# Patient Record
Sex: FEMALE | Race: WHITE | NOT HISPANIC OR LATINO | Employment: FULL TIME | URBAN - METROPOLITAN AREA
[De-identification: names, ages, dates, MRNs, and addresses within clinical notes are randomized per-mention and may not be internally consistent; named-entity substitution may affect disease eponyms.]

---

## 2023-04-03 LAB
ALANINE AMINOTRANSFERASE (SGPT) (U/L) IN SER/PLAS: 14 U/L (ref 7–45)
ALBUMIN (G/DL) IN SER/PLAS: 4.5 G/DL (ref 3.4–5)
ALKALINE PHOSPHATASE (U/L) IN SER/PLAS: 48 U/L (ref 33–110)
ANION GAP IN SER/PLAS: 10 MMOL/L (ref 10–20)
ASPARTATE AMINOTRANSFERASE (SGOT) (U/L) IN SER/PLAS: 14 U/L (ref 9–39)
BILIRUBIN TOTAL (MG/DL) IN SER/PLAS: 0.8 MG/DL (ref 0–1.2)
C REACTIVE PROTEIN (MG/L) IN SER/PLAS: 0.11 MG/DL
CALCIUM (MG/DL) IN SER/PLAS: 9.2 MG/DL (ref 8.6–10.3)
CARBON DIOXIDE, TOTAL (MMOL/L) IN SER/PLAS: 28 MMOL/L (ref 21–32)
CHLORIDE (MMOL/L) IN SER/PLAS: 105 MMOL/L (ref 98–107)
CREATININE (MG/DL) IN SER/PLAS: 0.91 MG/DL (ref 0.5–1.05)
ERYTHROCYTE DISTRIBUTION WIDTH (RATIO) BY AUTOMATED COUNT: 12.7 % (ref 11.5–14.5)
ERYTHROCYTE MEAN CORPUSCULAR HEMOGLOBIN CONCENTRATION (G/DL) BY AUTOMATED: 32.9 G/DL (ref 32–36)
ERYTHROCYTE MEAN CORPUSCULAR VOLUME (FL) BY AUTOMATED COUNT: 98 FL (ref 80–100)
ERYTHROCYTES (10*6/UL) IN BLOOD BY AUTOMATED COUNT: 4.38 X10E12/L (ref 4–5.2)
GFR FEMALE: 89 ML/MIN/1.73M2
GLUCOSE (MG/DL) IN SER/PLAS: 83 MG/DL (ref 74–99)
HEMATOCRIT (%) IN BLOOD BY AUTOMATED COUNT: 43.1 % (ref 36–46)
HEMOGLOBIN (G/DL) IN BLOOD: 14.2 G/DL (ref 12–16)
IGA (MG/DL) IN SER/PLAS: 123 MG/DL (ref 70–400)
IRON (UG/DL) IN SER/PLAS: 156 UG/DL (ref 35–150)
IRON BINDING CAPACITY (UG/DL) IN SER/PLAS: 329 UG/DL (ref 240–445)
IRON SATURATION (%) IN SER/PLAS: 47 % (ref 25–45)
LEUKOCYTES (10*3/UL) IN BLOOD BY AUTOMATED COUNT: 3.7 X10E9/L (ref 4.4–11.3)
PLATELETS (10*3/UL) IN BLOOD AUTOMATED COUNT: 189 X10E9/L (ref 150–450)
POTASSIUM (MMOL/L) IN SER/PLAS: 4 MMOL/L (ref 3.5–5.3)
PROTEIN TOTAL: 6.8 G/DL (ref 6.4–8.2)
SEDIMENTATION RATE, ERYTHROCYTE: <1 MM/H (ref 0–20)
SODIUM (MMOL/L) IN SER/PLAS: 139 MMOL/L (ref 136–145)
TISSUE TRANSGLUTAMINASE IGG: <1 U/ML (ref 0–14)
UREA NITROGEN (MG/DL) IN SER/PLAS: 12 MG/DL (ref 6–23)

## 2023-04-16 LAB
CAMPYLOBACTER GP: NOT DETECTED
NOROVIRUS GI/GII: NOT DETECTED
ROTAVIRUS A: NOT DETECTED
SALMONELLA SP.: NOT DETECTED
SHIGA TOXIN 1: NOT DETECTED
SHIGA TOXIN 2: NOT DETECTED
SHIGELLA SP.: NOT DETECTED
VIBRIO GRP.: NOT DETECTED
YERSINIA ENTEROCOLITICA: NOT DETECTED

## 2023-04-19 LAB — CALPROTECTIN, STOOL: 64 UG/G

## 2023-04-20 LAB — REDUCING SUBSTANCES PRESENCE IN STOOL: ABNORMAL

## 2023-06-09 ENCOUNTER — LAB (OUTPATIENT)
Dept: LAB | Facility: LAB | Age: 28
End: 2023-06-09
Payer: COMMERCIAL

## 2023-06-09 LAB — TOBACCO SCREEN, URINE: NEGATIVE

## 2023-10-28 PROBLEM — R53.83 FATIGUE: Status: ACTIVE | Noted: 2023-10-28

## 2023-10-28 PROBLEM — R20.9 COLD HANDS: Status: ACTIVE | Noted: 2023-10-28

## 2023-10-28 PROBLEM — B36.0 PITYRIASIS VERSICOLOR: Status: ACTIVE | Noted: 2022-12-05

## 2023-10-28 PROBLEM — S86.899A MEDIAL TIBIAL STRESS SYNDROME: Status: ACTIVE | Noted: 2023-10-28

## 2023-10-28 PROBLEM — L50.3 DERMATOGRAPHISM: Status: ACTIVE | Noted: 2022-12-05

## 2023-10-28 PROBLEM — H49.11: Status: ACTIVE | Noted: 2020-03-10

## 2023-10-28 PROBLEM — B35.1 ONYCHOMYCOSIS: Status: ACTIVE | Noted: 2023-10-28

## 2023-10-28 PROBLEM — B35.1 ONYCHOMYCOSIS OF GREAT TOE: Status: ACTIVE | Noted: 2023-10-28

## 2023-10-28 PROBLEM — R51.9 HEADACHE: Status: ACTIVE | Noted: 2023-10-28

## 2023-10-28 PROBLEM — L30.9 ECZEMA: Status: ACTIVE | Noted: 2023-10-28

## 2023-10-28 PROBLEM — H53.8 BLURRY VISION: Status: ACTIVE | Noted: 2023-10-28

## 2023-10-28 PROBLEM — H50.21 HYPERTROPIA OF RIGHT EYE: Status: ACTIVE | Noted: 2020-03-10

## 2023-10-28 PROBLEM — S06.0X1A CONCUSSION WTH LOSS OF CONSCIOUSNESS OF 30 MINUTES OR LESS: Status: ACTIVE | Noted: 2023-10-28

## 2023-10-28 PROBLEM — H53.2 DIPLOPIA: Status: ACTIVE | Noted: 2020-03-10

## 2023-10-28 PROBLEM — S09.90XA HEAD INJURY: Status: ACTIVE | Noted: 2023-10-28

## 2023-10-28 PROBLEM — M89.9 LUMP OF RIB: Status: ACTIVE | Noted: 2023-10-28

## 2023-10-28 PROBLEM — R55 MICTURITION SYNCOPE: Status: ACTIVE | Noted: 2023-10-28

## 2023-10-28 RX ORDER — LORATADINE 10 MG/1
10 TABLET ORAL
COMMUNITY
Start: 2022-12-05 | End: 2023-12-04 | Stop reason: WASHOUT

## 2023-10-28 RX ORDER — KETOCONAZOLE 20 MG/G
1 CREAM TOPICAL
COMMUNITY
Start: 2022-12-05 | End: 2023-12-04 | Stop reason: WASHOUT

## 2023-10-28 RX ORDER — ONDANSETRON 4 MG/1
4 TABLET, ORALLY DISINTEGRATING ORAL EVERY 4 HOURS PRN
COMMUNITY
Start: 2022-09-25 | End: 2023-12-04 | Stop reason: WASHOUT

## 2023-10-30 ENCOUNTER — TELEPHONE (OUTPATIENT)
Dept: GASTROENTEROLOGY | Facility: CLINIC | Age: 28
End: 2023-10-30

## 2023-10-30 ENCOUNTER — OFFICE VISIT (OUTPATIENT)
Dept: GASTROENTEROLOGY | Facility: CLINIC | Age: 28
End: 2023-10-30
Payer: COMMERCIAL

## 2023-10-30 DIAGNOSIS — R14.0 BLOATING: Primary | ICD-10-CM

## 2023-10-30 PROCEDURE — 91065 BREATH HYDROGEN/METHANE TEST: CPT | Performed by: NURSE PRACTITIONER

## 2023-10-30 NOTE — PROGRESS NOTES
Patient ID: Nadia Souza is a 28 y.o. female.    Breath Hydrogen Test-Bacterial Overgrowth    Date/Time: 10/30/2023 10:39 AM    Performed by: Eliza Pfeiffer RN  Authorized by: LOIDA Hansen    Universal protocol:     Patient identity confirmed:  Verbally with patient  Indications:     Indications:  Bloating  Sedation:     Sedation type:  None  Post-procedure details:     Procedure completion:  Tolerated  Hydrogen Breath Analysis Consultation Sheet    Referring Provider: Loida Jimenez  [facility] [address] [address]      Symptoms: Bloating    Age: 28 y.o.  Weight: There were no vitals filed for this visit.  Substrate: 75 GMS DEXTROSE     Last Meal: 1830  Recent Antibiotics: Denies    RESULTS:   Time PPM (H2) APPM* (CH4) CO2 Correction   Baseline #1 825 2 3 6.6 0.84   Baseline #2 830 2 3 6.4 0.86   *Challenge Dose Sugar: 840  15' 0900 4 5 6.2 0.82   30' 0915 16 8 6.6 0.68   45' 0930 15 6 8.0 0.67   60' 0945 5 5 8.3 0.79   75' 1000 5 5 7.0 0.66   90' 1015 5 5 7.6 0.72   105'        120'        135'        150'        165'        180'          Impression: Negative for SIBO and methane

## 2023-11-02 DIAGNOSIS — E74.31 SUCROSE-ISOMALTOSE DISACCHARIDASE DEFICIENCY: Primary | ICD-10-CM

## 2023-11-03 ENCOUNTER — TELEPHONE (OUTPATIENT)
Dept: GASTROENTEROLOGY | Facility: CLINIC | Age: 28
End: 2023-11-03
Payer: COMMERCIAL

## 2023-11-03 NOTE — TELEPHONE ENCOUNTER
Per Josephine-    Sucrose Breath test is positive. Patient lacks enough sucrase enzyme to help digest sugars. I will send in RX for Sucraid to [facility] specialty pharmacy. Patient should go on line and look up Sucraid for more information, or she can follow up with me in office.     Patient is aware of result. She will read up on it and call back to make an appt. If needed.

## 2023-11-16 ENCOUNTER — OFFICE VISIT (OUTPATIENT)
Dept: DERMATOLOGY | Facility: CLINIC | Age: 28
End: 2023-11-16
Payer: COMMERCIAL

## 2023-11-16 VITALS — DIASTOLIC BLOOD PRESSURE: 69 MMHG | SYSTOLIC BLOOD PRESSURE: 112 MMHG

## 2023-11-16 DIAGNOSIS — B35.1 TINEA UNGUIUM: ICD-10-CM

## 2023-11-16 DIAGNOSIS — L60.9 NAIL DISORDER: Primary | ICD-10-CM

## 2023-11-16 DIAGNOSIS — I73.00 RAYNAUD'S DISEASE WITHOUT GANGRENE: ICD-10-CM

## 2023-11-16 PROCEDURE — 1036F TOBACCO NON-USER: CPT | Performed by: DERMATOLOGY

## 2023-11-16 PROCEDURE — 88304 TISSUE EXAM BY PATHOLOGIST: CPT | Mod: TC,DER | Performed by: DERMATOLOGY

## 2023-11-16 PROCEDURE — 99203 OFFICE O/P NEW LOW 30 MIN: CPT | Performed by: DERMATOLOGY

## 2023-11-16 PROCEDURE — 88304 TISSUE EXAM BY PATHOLOGIST: CPT | Performed by: DERMATOLOGY

## 2023-11-16 ASSESSMENT — ITCH NUMERIC RATING SCALE: HOW SEVERE IS YOUR ITCHING?: 1

## 2023-11-16 NOTE — PROGRESS NOTES
Subjective     Nadia Souza is a 28 y.o. female who presents for the following: Nail Problem (Toe nail fungus on both feet. Began about 9 years ago. Saw PCP last year for this and was prescribed a topical medication, however patient states it did not improve. ), Pigment Change (Patient states that she has pigment change on face. Patient states that she has discoloration and redness. ), and Rash (Patient states that she has occasional rashes on her feet. ). Never had  her nail tested for fungus.  She has Raynaud's. All year round she has the skin changes. When she is cold it becomes purple. She gets cold fingers and toes. She denies difficulty swallowing. She has not taken any medication for it. It bothers her a lot. Face is sometimes really white. Sometimes when she tries to warm her hands her face will turn red. She does not drink alcohol. No history of hepatitis. She gets flushed when she laughs and when she eats hot and spicy food.     Review of Systems:  No other skin or systemic complaints other than what is documented elsewhere in the note.    The following portions of the chart were reviewed this encounter and updated as appropriate:          Skin Cancer History  No skin cancer on file.      Specialty Problems          Dermatology Problems    Dermatographism    Pityriasis versicolor    Eczema    Onychomycosis    Onychomycosis of great toe        Objective   Well appearing patient in no apparent distress; mood and affect are within normal limits.    A focused skin examination was performed. All findings within normal limits unless otherwise noted below.    Assessment/Plan   1. Nail disorder (2)  Left Foot - Anterior, Right Foot - Anterior  Toe nails with distal whitening both 1st toes, and all of right toe nails.    Discussed risk of liver inflammation and decreased white blood cell count with oral terbinafine.  Discussed efficacy of oral terbinafine versus topical antifungals (80-85% versus 10-30%).   Discussed risk of liver inflammation and decrease in white blood cells with oral terbinafine. Discussed need for 3 month treatment and 12 months before nails appear normal. Discussed avoiding alcohol while on treatment with oral terbinafine. Discussed checking blood counts and liver test before starting terbinafine. Discussed risk of developing onychomycosis again after treatment is completed. Discussed daily application of topical lacquers with the need to remove once a week.  Discussed that if fevers develop while on terbinafine patient should stop terbinafine then call me.      Specimen 1 - Dermatopathology- DERM LAB  Differential Diagnosis: Tinea unguim  Check Margins Yes/No?:    Comments:    Dermpath Lab: Routine Histopathology (formalin-fixed tissue)    Related Procedures  Hepatitis B Core Antibody, Igm  Hepatitis B Surface Antigen  Hepatitis B Surface Antibody  Hepatitis C Antibody    2. Tinea unguium    Related Procedures  CBC and Auto Differential  Comprehensive metabolic panel    3. Raynaud's disease without gangrene  Right Hand - Anterior  No erythema or purple or white or blue. No sclerodactyly or telangiectasias.  /68.    Discussed oral antihypertensives (nifedipine) as a possible treatment.  Discussed dysphagia and thickening of skin as findings of scleroderma and CREST that can be associated with Raynaud's.      Related Procedures  Bebe-With Reflex To Chichi  TSH w/Reflex To Free T4 If Abnormal  Anti-scleroderma antibody    Discussed that her flushing could be related to rosacea. She denies acne like bumps on her face.  Discussed that there are topical redness medications that may not be covered by insurance that might help with the redness. She declined for now.

## 2023-11-20 LAB
LAB AP ASR DISCLAIMER: NORMAL
LABORATORY COMMENT REPORT: NORMAL
PATH REPORT.FINAL DX SPEC: NORMAL
PATH REPORT.GROSS SPEC: NORMAL
PATH REPORT.RELEVANT HX SPEC: NORMAL
PATH REPORT.TOTAL CANCER: NORMAL

## 2023-12-01 ENCOUNTER — TELEPHONE (OUTPATIENT)
Dept: GASTROENTEROLOGY | Facility: CLINIC | Age: 28
End: 2023-12-01
Payer: COMMERCIAL

## 2023-12-04 ENCOUNTER — OFFICE VISIT (OUTPATIENT)
Dept: PRIMARY CARE | Facility: CLINIC | Age: 28
End: 2023-12-04
Payer: COMMERCIAL

## 2023-12-04 VITALS
HEART RATE: 65 BPM | TEMPERATURE: 96.9 F | BODY MASS INDEX: 20.49 KG/M2 | HEIGHT: 70 IN | OXYGEN SATURATION: 98 % | DIASTOLIC BLOOD PRESSURE: 64 MMHG | SYSTOLIC BLOOD PRESSURE: 108 MMHG | WEIGHT: 143.1 LBS

## 2023-12-04 DIAGNOSIS — Z00.00 ROUTINE ADULT HEALTH MAINTENANCE: Primary | ICD-10-CM

## 2023-12-04 PROBLEM — E74.31 SUCROSE INTOLERANCE: Status: ACTIVE | Noted: 2023-12-04

## 2023-12-04 PROBLEM — S09.90XA HEAD INJURY: Status: RESOLVED | Noted: 2023-10-28 | Resolved: 2023-12-04

## 2023-12-04 PROBLEM — S06.0X1A CONCUSSION WTH LOSS OF CONSCIOUSNESS OF 30 MINUTES OR LESS: Status: RESOLVED | Noted: 2023-10-28 | Resolved: 2023-12-04

## 2023-12-04 PROBLEM — E73.9 LACTOSE INTOLERANCE: Status: ACTIVE | Noted: 2023-12-04

## 2023-12-04 PROCEDURE — 1036F TOBACCO NON-USER: CPT | Performed by: INTERNAL MEDICINE

## 2023-12-04 PROCEDURE — 99395 PREV VISIT EST AGE 18-39: CPT | Performed by: INTERNAL MEDICINE

## 2023-12-04 ASSESSMENT — ENCOUNTER SYMPTOMS
CHILLS: 0
DYSURIA: 0
VOMITING: 0
COUGH: 0
SHORTNESS OF BREATH: 0
SORE THROAT: 0
BACK PAIN: 0
FATIGUE: 0
FEVER: 0
CONSTIPATION: 0
LIGHT-HEADEDNESS: 0
PALPITATIONS: 0
DIARRHEA: 0
HEADACHES: 0
HEMATURIA: 0
CONFUSION: 0
ABDOMINAL PAIN: 0
ARTHRALGIAS: 1
DIZZINESS: 0
NAUSEA: 0

## 2023-12-04 ASSESSMENT — PATIENT HEALTH QUESTIONNAIRE - PHQ9
2. FEELING DOWN, DEPRESSED OR HOPELESS: NOT AT ALL
1. LITTLE INTEREST OR PLEASURE IN DOING THINGS: NOT AT ALL
SUM OF ALL RESPONSES TO PHQ9 QUESTIONS 1 AND 2: 0

## 2023-12-04 NOTE — PROGRESS NOTES
"Subjective   Patient ID: Nadia Souza is a 28 y.o. female who presents for Annual Exam.    HPI     Since last visit - diagnosed with lactose and sucrose intolerance.  She cannot eat fruit and most vegetables.  The only veggies she can eat are spinach, kale, and green beans.  She is considering seeing a holistic practitioner or dietician.    In PT to help with shin pain.  Cut out refined sugar, pain improved.  Dermatologist has ordered additional autoimmune labs.      Review of Systems   Constitutional:  Negative for chills, fatigue and fever.   HENT:  Negative for sore throat.    Respiratory:  Negative for cough and shortness of breath.    Cardiovascular:  Negative for chest pain, palpitations and leg swelling.   Gastrointestinal:  Negative for abdominal pain, constipation, diarrhea, nausea and vomiting.   Genitourinary:  Negative for dysuria and hematuria.   Musculoskeletal:  Positive for arthralgias. Negative for back pain and gait problem.   Neurological:  Negative for dizziness, light-headedness and headaches.   Psychiatric/Behavioral:  Negative for confusion.        Objective   /64   Pulse 65   Temp 36.1 °C (96.9 °F)   Ht 1.778 m (5' 10\")   Wt 64.9 kg (143 lb 1.6 oz)   SpO2 98%   BMI 20.53 kg/m²     Physical Exam  Constitutional:       Appearance: Normal appearance.   HENT:      Head: Normocephalic and atraumatic.      Right Ear: Tympanic membrane and ear canal normal.      Left Ear: Tympanic membrane and ear canal normal.      Mouth/Throat:      Mouth: Mucous membranes are moist.      Pharynx: Oropharynx is clear. No oropharyngeal exudate or posterior oropharyngeal erythema.   Cardiovascular:      Rate and Rhythm: Normal rate and regular rhythm.      Pulses: Normal pulses.   Pulmonary:      Effort: Pulmonary effort is normal. No respiratory distress.      Breath sounds: Normal breath sounds. No wheezing.   Abdominal:      General: There is no distension.      Palpations: Abdomen is soft.      " Tenderness: There is no abdominal tenderness.   Musculoskeletal:      Right lower leg: No edema.      Left lower leg: No edema.   Skin:     Findings: No rash.   Neurological:      General: No focal deficit present.      Mental Status: She is alert and oriented to person, place, and time. Mental status is at baseline.      Gait: Gait normal.   Psychiatric:         Mood and Affect: Mood normal.         Behavior: Behavior normal.         Thought Content: Thought content normal.         Judgment: Judgment normal.         Assessment/Plan   Problem List Items Addressed This Visit             ICD-10-CM    Routine adult health maintenance - Primary Z00.00    Relevant Orders    Lipid Panel       Well  Visit    BP is normal.  Healthy habits reviewed and recommended.  Consider seeing dietician given limited diet (lactose and sucrose intolerance).      Check FLP (encouraged her to get lab work ordered by dermatologist at same time).     Cervical Cancer Screening recommended.    Reviewed signs of skin cancer and importance of sun protection.    Stay up to date with routine dental and eye exams.    Had flu shot at work.    Follow-up annually and PRN.

## 2023-12-05 ENCOUNTER — LAB (OUTPATIENT)
Dept: LAB | Facility: LAB | Age: 28
End: 2023-12-05
Payer: COMMERCIAL

## 2023-12-05 DIAGNOSIS — I73.00 RAYNAUD'S DISEASE WITHOUT GANGRENE: ICD-10-CM

## 2023-12-05 DIAGNOSIS — B35.1 TINEA UNGUIUM: ICD-10-CM

## 2023-12-05 DIAGNOSIS — Z00.00 ROUTINE ADULT HEALTH MAINTENANCE: ICD-10-CM

## 2023-12-05 DIAGNOSIS — L60.9 NAIL DISORDER: ICD-10-CM

## 2023-12-05 LAB
ALBUMIN SERPL BCP-MCNC: 4.7 G/DL (ref 3.4–5)
ALP SERPL-CCNC: 48 U/L (ref 33–110)
ALT SERPL W P-5'-P-CCNC: 9 U/L (ref 7–45)
ANION GAP SERPL CALC-SCNC: 14 MMOL/L (ref 10–20)
AST SERPL W P-5'-P-CCNC: 11 U/L (ref 9–39)
BASOPHILS # BLD AUTO: 0.03 X10*3/UL (ref 0–0.1)
BASOPHILS NFR BLD AUTO: 0.8 %
BILIRUB SERPL-MCNC: 0.6 MG/DL (ref 0–1.2)
BUN SERPL-MCNC: 16 MG/DL (ref 6–23)
CALCIUM SERPL-MCNC: 9.8 MG/DL (ref 8.6–10.6)
CHLORIDE SERPL-SCNC: 106 MMOL/L (ref 98–107)
CHOLEST SERPL-MCNC: 156 MG/DL (ref 0–199)
CHOLESTEROL/HDL RATIO: 2.5
CO2 SERPL-SCNC: 27 MMOL/L (ref 21–32)
CREAT SERPL-MCNC: 0.91 MG/DL (ref 0.5–1.05)
EOSINOPHIL # BLD AUTO: 0.27 X10*3/UL (ref 0–0.7)
EOSINOPHIL NFR BLD AUTO: 6.8 %
ERYTHROCYTE [DISTWIDTH] IN BLOOD BY AUTOMATED COUNT: 12.8 % (ref 11.5–14.5)
GFR SERPL CREATININE-BSD FRML MDRD: 89 ML/MIN/1.73M*2
GLUCOSE SERPL-MCNC: 84 MG/DL (ref 74–99)
HBV CORE IGM SER QL: NONREACTIVE
HBV SURFACE AB SER-ACNC: 42 MIU/ML
HBV SURFACE AG SERPL QL IA: NONREACTIVE
HCT VFR BLD AUTO: 44.9 % (ref 36–46)
HCV AB SER QL: NONREACTIVE
HDLC SERPL-MCNC: 62.6 MG/DL
HGB BLD-MCNC: 14.5 G/DL (ref 12–16)
IMM GRANULOCYTES # BLD AUTO: 0.02 X10*3/UL (ref 0–0.7)
IMM GRANULOCYTES NFR BLD AUTO: 0.5 % (ref 0–0.9)
LDLC SERPL CALC-MCNC: 84 MG/DL
LYMPHOCYTES # BLD AUTO: 1.5 X10*3/UL (ref 1.2–4.8)
LYMPHOCYTES NFR BLD AUTO: 37.6 %
MCH RBC QN AUTO: 31.4 PG (ref 26–34)
MCHC RBC AUTO-ENTMCNC: 32.3 G/DL (ref 32–36)
MCV RBC AUTO: 97 FL (ref 80–100)
MONOCYTES # BLD AUTO: 0.43 X10*3/UL (ref 0.1–1)
MONOCYTES NFR BLD AUTO: 10.8 %
NEUTROPHILS # BLD AUTO: 1.74 X10*3/UL (ref 1.2–7.7)
NEUTROPHILS NFR BLD AUTO: 43.5 %
NON HDL CHOLESTEROL: 93 MG/DL (ref 0–149)
NRBC BLD-RTO: 0 /100 WBCS (ref 0–0)
PLATELET # BLD AUTO: 193 X10*3/UL (ref 150–450)
POTASSIUM SERPL-SCNC: 4.3 MMOL/L (ref 3.5–5.3)
PROT SERPL-MCNC: 7 G/DL (ref 6.4–8.2)
RBC # BLD AUTO: 4.62 X10*6/UL (ref 4–5.2)
SODIUM SERPL-SCNC: 143 MMOL/L (ref 136–145)
TRIGL SERPL-MCNC: 48 MG/DL (ref 0–149)
TSH SERPL-ACNC: 1.85 MIU/L (ref 0.44–3.98)
VLDL: 10 MG/DL (ref 0–40)
WBC # BLD AUTO: 4 X10*3/UL (ref 4.4–11.3)

## 2023-12-05 PROCEDURE — 86706 HEP B SURFACE ANTIBODY: CPT

## 2023-12-05 PROCEDURE — 86705 HEP B CORE ANTIBODY IGM: CPT

## 2023-12-05 PROCEDURE — 86038 ANTINUCLEAR ANTIBODIES: CPT

## 2023-12-05 PROCEDURE — 80061 LIPID PANEL: CPT

## 2023-12-05 PROCEDURE — 85025 COMPLETE CBC W/AUTO DIFF WBC: CPT

## 2023-12-05 PROCEDURE — 36415 COLL VENOUS BLD VENIPUNCTURE: CPT

## 2023-12-05 PROCEDURE — 86803 HEPATITIS C AB TEST: CPT

## 2023-12-05 PROCEDURE — 84443 ASSAY THYROID STIM HORMONE: CPT

## 2023-12-05 PROCEDURE — 80053 COMPREHEN METABOLIC PANEL: CPT

## 2023-12-05 PROCEDURE — 87340 HEPATITIS B SURFACE AG IA: CPT

## 2023-12-06 ENCOUNTER — TELEPHONE (OUTPATIENT)
Dept: LAB | Facility: HOSPITAL | Age: 28
End: 2023-12-06
Payer: COMMERCIAL

## 2023-12-06 LAB — ANA SER QL HEP2 SUBST: NEGATIVE

## 2023-12-11 DIAGNOSIS — E74.31 SUCROSE INTOLERANCE: Primary | ICD-10-CM

## 2023-12-13 DIAGNOSIS — B35.1 ONYCHOMYCOSIS: Primary | ICD-10-CM

## 2023-12-13 RX ORDER — CICLOPIROX 80 MG/ML
SOLUTION TOPICAL NIGHTLY
Qty: 192.857 ML | Refills: 3 | Status: SHIPPED | OUTPATIENT
Start: 2023-12-13

## 2023-12-13 NOTE — PROGRESS NOTES
I spoke with the patient about her results. I discussed that her wbc is below the normal range. She did have a similar low WBC 8 months ago and 4-5 years ago they were in the normal range. I discussed if she does want to take the oral terbinafine I would want to check her blood counts after 6 weeks because of her low baseline. The patient was warned of the risk of liver inflammation with oral terbinafine and the risk of decreased white blood cells. She will try topical treatment for now. Discussed efficacy of oral terbinafine versus topical antifungals (80-85% versus 10-30%).  Discussed risk of liver inflammation and decrease in white blood cells with oral terbinafine. Discussed need for 3 month treatment and 12 months before nails appear normal. Discussed avoiding alcohol while on treatment with oral terbinafine. Discussed checking blood counts and liver test before starting terbinafine. Discussed risk of developing onychomycosis again after treatment is completed. Discussed daily application of topical lacquers with the need to remove once a week.  Discussed that if fevers develop while on terbinafine patient should stop terbinafine then call me.    Discussed AWILDA was normal.  Discussed oral calcium channel blockers and need to monitor blood pressure. She does not want to take this medication now. We discussed the importance of keeping her distal extremities and core body temperature warm for Raynaud's syndrome.  Follow up as needed.

## 2024-01-09 ENCOUNTER — APPOINTMENT (OUTPATIENT)
Dept: RHEUMATOLOGY | Facility: CLINIC | Age: 29
End: 2024-01-09
Payer: COMMERCIAL

## 2024-02-14 ENCOUNTER — TELEPHONE (OUTPATIENT)
Dept: GASTROENTEROLOGY | Facility: CLINIC | Age: 29
End: 2024-02-14
Payer: COMMERCIAL

## 2024-02-14 NOTE — TELEPHONE ENCOUNTER
----- Message from LOIDA Jimenez sent at 2/7/2024  3:41 PM EST -----  Regarding: RE: Sucraid Rx  Send this to the Sucraid rep, she if she can give her samples.   ----- Message -----  From: Sandy Navarro MA  Sent: 1/24/2024   8:53 AM EST  To: LOIDA Jimenez  Subject: Sucraid Rx                                       [facility] is asking for an appeal for Sucraid. They need a  letter of medical necessity to be faxed to [phone].   Phone number is [phone] Candida Souza /Maria Isabel Patton.

## 2024-05-01 ENCOUNTER — OFFICE VISIT (OUTPATIENT)
Dept: PRIMARY CARE | Facility: CLINIC | Age: 29
End: 2024-05-01
Payer: COMMERCIAL

## 2024-05-01 VITALS
BODY MASS INDEX: 19.33 KG/M2 | HEIGHT: 70 IN | HEART RATE: 78 BPM | SYSTOLIC BLOOD PRESSURE: 107 MMHG | TEMPERATURE: 97.4 F | DIASTOLIC BLOOD PRESSURE: 70 MMHG | WEIGHT: 135 LBS | OXYGEN SATURATION: 99 %

## 2024-05-01 DIAGNOSIS — S93.402D SPRAIN OF LEFT ANKLE, UNSPECIFIED LIGAMENT, SUBSEQUENT ENCOUNTER: Primary | ICD-10-CM

## 2024-05-01 DIAGNOSIS — Z09 HOSPITAL DISCHARGE FOLLOW-UP: ICD-10-CM

## 2024-05-01 DIAGNOSIS — L03.119 CELLULITIS OF FOOT: ICD-10-CM

## 2024-05-01 PROCEDURE — 1036F TOBACCO NON-USER: CPT | Performed by: NURSE PRACTITIONER

## 2024-05-01 PROCEDURE — 99214 OFFICE O/P EST MOD 30 MIN: CPT | Performed by: NURSE PRACTITIONER

## 2024-05-01 RX ORDER — DOXYCYCLINE 100 MG/1
100 CAPSULE ORAL 2 TIMES DAILY
Qty: 20 CAPSULE | Refills: 0 | Status: SHIPPED | OUTPATIENT
Start: 2024-05-01 | End: 2024-05-11

## 2024-05-01 ASSESSMENT — ENCOUNTER SYMPTOMS
ARTHRALGIAS: 1
FATIGUE: 0
ACTIVITY CHANGE: 1
DIARRHEA: 0
COUGH: 0
NUMBNESS: 0
CHILLS: 0
SHORTNESS OF BREATH: 0
WOUND: 1
FEVER: 0
CONSTIPATION: 0
UNEXPECTED WEIGHT CHANGE: 0
JOINT SWELLING: 1
ABDOMINAL DISTENTION: 0

## 2024-05-01 ASSESSMENT — PATIENT HEALTH QUESTIONNAIRE - PHQ9
SUM OF ALL RESPONSES TO PHQ9 QUESTIONS 1 AND 2: 0
1. LITTLE INTEREST OR PLEASURE IN DOING THINGS: NOT AT ALL
2. FEELING DOWN, DEPRESSED OR HOPELESS: NOT AT ALL

## 2024-05-01 NOTE — PROGRESS NOTES
Chief Complaint  Hospital Follow-up (Left ankle sprain - swelling -taking ibuprofen for pain).    History Of Present Illness  Nadia Souza is a 28 y.o. female presents today for follow up of Hospital Follow-up (Left ankle sprain - swelling -taking ibuprofen for pain).  Presents today with mom.     Presents today for ED follow-up.  Seen in ED on 4/21/2024 for left ankle pain.  States she was running in the street when she accidentally tripped and sustained an inversion injury to the left Ankle.  X-ray left ankle was obtained-no discernible acute fracture or dislocation noted.  Soft tissue swelling noted discharged to home with splint, ibuprofen.    States since ED visit has been non-weight bearing to L LE.  Is using crutches.  Has been elevating LLE, applying ice 5-6 x/day, taking ibuprofen approx twice per day.   Pain persists.  She is not able to bear weight due to pain.  Pain is constant.  Has been trying to rest and elevate as much as possible. Is wearing air brace while awake.   She did sustain an abrasion to R knee which has scabbed over.  Also has an abrasion to L lateral ankle which is still draining small amount of sanguinous fluid.       Review of Systems  Review of Systems   Constitutional:  Positive for activity change. Negative for chills, fatigue, fever and unexpected weight change.   Respiratory:  Negative for cough and shortness of breath.    Cardiovascular:  Positive for leg swelling (L LE). Negative for chest pain.   Gastrointestinal:  Negative for abdominal distention, constipation and diarrhea.   Genitourinary: Negative.    Musculoskeletal:  Positive for arthralgias, gait problem and joint swelling.   Skin:  Positive for wound (R knee abrasion, L ankle (lateral) abrasion). Negative for pallor.   Neurological:  Negative for numbness.       Past Medical History  She has a past medical history of Body mass index (BMI) 20.0-20.9, adult (09/30/2019), Concussion with loss of consciousness of 30  minutes or less, subsequent encounter (10/11/2022), Concussion without loss of consciousness, subsequent encounter (01/14/2019), Concussion wth loss of consciousness of 30 minutes or less (10/28/2023), Head injury (10/28/2023), Impacted cerumen, right ear (09/30/2019), Other specified abnormal findings of blood chemistry (07/20/2018), Other specified health status, Personal history of diseases of the blood and blood-forming organs and certain disorders involving the immune mechanism, Personal history of diseases of the blood and blood-forming organs and certain disorders involving the immune mechanism (01/10/2019), Personal history of other (healed) physical injury and trauma (09/29/2022), Personal history of other diseases of the circulatory system, Personal history of other diseases of the respiratory system, Personal history of other endocrine, nutritional and metabolic disease (01/10/2019), Personal history of other specified conditions (09/29/2022), Personal history of traumatic brain injury, Rash and other nonspecific skin eruption (12/06/2022), Strain of muscle, fascia and tendon at neck level, initial encounter (12/05/2018), Syncope and collapse (11/30/2022), and Unspecified asthma, uncomplicated ([facility]) (04/25/2018).    Surgical History  She has a past surgical history that includes Other surgical history (11/19/2018).    Family History  Family History   Problem Relation Name Age of Onset    Allergic rhinitis Mother      Hypertension Mother      Irritable bowel syndrome Mother      Multiple sclerosis Mother      Allergic rhinitis Maternal Grandmother      Anemia Maternal Grandmother      Other (hearing problem) Maternal Grandmother      Other (overweight) Maternal Grandmother      Ulcerative colitis Maternal Grandfather      Other (eye disorder) Paternal Grandmother      Alcohol abuse Other Uncle     Arthritis Other Grandfather     Arthritis Other Grandmother     Multiple myeloma Other Grandmother      "Other (cardiac disorder) Other Grandparent     Diabetes Other Grandparent         Social History  She reports that she has never smoked. She has never used smokeless tobacco. She reports that she does not currently use alcohol. She reports that she does not use drugs.    DEPRESSION SCREEN  Over the past 2 weeks, how often have you been bothered by any of the following problems?  Little interest or pleasure in doing things: Not at all  Feeling down, depressed, or hopeless: Not at all      Allergies  Pseudoephedrine and Sudafed om    Medications  Current Outpatient Medications   Medication Instructions    ciclopirox (Penlac) 8 % solution Topical, Nightly, Remove once a week with nail polish remover    doxycycline (VIBRAMYCIN) 100 mg, oral, 2 times daily, Take with at least 8 ounces (large glass) of water, do not lie down for 30 minutes after    sacrosidase 8,500 unit/mL solution 2 mL, oral, 4 times daily        Objective   /70 (BP Location: Right arm, Patient Position: Sitting, BP Cuff Size: Adult)   Pulse 78   Temp 36.3 °C (97.4 °F) (Temporal)   Ht 1.778 m (5' 10\")   Wt 61.2 kg (135 lb)   SpO2 99%   BMI 19.37 kg/m²    BMI: Estimated body mass index is 19.37 kg/m² as calculated from the following:    Height as of this encounter: 1.778 m (5' 10\").    Weight as of this encounter: 61.2 kg (135 lb).    Physical Exam  Physical Exam  Vitals and nursing note reviewed.   Constitutional:       Appearance: Normal appearance.   HENT:      Head: Normocephalic and atraumatic.      Nose: Nose normal.      Mouth/Throat:      Mouth: Mucous membranes are moist.      Pharynx: Oropharynx is clear.   Eyes:      Extraocular Movements: Extraocular movements intact.      Conjunctiva/sclera: Conjunctivae normal.   Pulmonary:      Effort: Pulmonary effort is normal.   Musculoskeletal:         General: Swelling, tenderness and signs of injury present.      Cervical back: Neck supple.      Left lower leg: Edema present.      Right " ankle: Normal.      Left ankle: Swelling, ecchymosis and laceration present. Tenderness present over the lateral malleolus. Decreased range of motion.      Comments: L ankle swelling, bruisng noted  above and around heal, tender to touch, increased warmth, redness on top of foot extending from area or abrasion.    Skin:     General: Skin is warm and dry.   Neurological:      General: No focal deficit present.      Mental Status: She is alert and oriented to person, place, and time. Mental status is at baseline.   Psychiatric:         Mood and Affect: Mood normal.         Behavior: Behavior normal.         Thought Content: Thought content normal.         Judgment: Judgment normal.         Relevant Results and Imaging               Assessment and Plan  Assessment/Plan   Problem List Items Addressed This Visit    None  Visit Diagnoses         Codes    Sprain of left ankle, unspecified ligament, subsequent encounter    -  Primary S93.402D    -non weight bearing to L LE- let pain be guide.    -RICE therapy   cont brace  -ibuprofen 400 TID x 7 days   -ortho referral  -off work until at least 5/13/24     Relevant Medications    doxycycline (Vibramycin) 100 mg capsule    Other Relevant Orders    Referral to Orthopaedic Surgery    Disability Placard    Cellulitis of foot     L03.119    -start doxycycline 100 BID x 10 days     Hospital discharge follow-up     Z09          Work letter provided to patient.

## 2024-05-01 NOTE — LETTER
May 1, 2024     Patient: Nadia Souza   YOB: 1995   Date of Visit: 5/1/2024       To Whom It May Concern:    Nadia Souza was seen in my clinic on 5/1/2024.  Nadia may not return to work until at least 5/13/24.  Will need to be reevaluated prior to 5/13/24 to ensure improvement/resolution of symptoms.      If you have any questions or concerns, please don't hesitate to call.         Sincerely,         GARY Nicole-CNP        CC: No Recipients

## 2024-05-01 NOTE — PATIENT INSTRUCTIONS
Ibuprofen 400 mg three times per day x 7 days   Ice the ankle x 15 min - 5-6x/day   Elevate leg.    Keep brace on when out of bed.         Ortho: You can schedule your referral appointment using [facility] or by calling [phone].     [facility] group: [phone].

## 2024-05-02 ENCOUNTER — OFFICE VISIT (OUTPATIENT)
Dept: ORTHOPEDIC SURGERY | Facility: CLINIC | Age: 29
End: 2024-05-02
Payer: COMMERCIAL

## 2024-05-02 VITALS — WEIGHT: 135 LBS | HEIGHT: 70 IN | BODY MASS INDEX: 19.33 KG/M2

## 2024-05-02 DIAGNOSIS — S93.402D SPRAIN OF LEFT ANKLE, UNSPECIFIED LIGAMENT, SUBSEQUENT ENCOUNTER: ICD-10-CM

## 2024-05-02 PROCEDURE — 1036F TOBACCO NON-USER: CPT | Performed by: ORTHOPAEDIC SURGERY

## 2024-05-02 PROCEDURE — 99203 OFFICE O/P NEW LOW 30 MIN: CPT | Performed by: ORTHOPAEDIC SURGERY

## 2024-05-02 NOTE — PROGRESS NOTES
Subjective    Patient ID: Nadia Souza is a 28 y.o. female.    Chief Complaint: Pain of the Left Ankle       This is a 28-year-old female who presents for evaluation of left ankle pain and swelling as a direct result of an injury that occurred within the last week.  She describes a twisting injury to her left ankle and having immediate pain and difficulty with weightbearing.  Subsequently went to an urgent care facility where x-rays were negative for fracture and the patient was placed in a stirrup air splint as well as on crutches.  Because of various school/work requirements she was on her feet and essentially not performing enough ice and elevation.  She developed significant left ankle swelling and ecchymosis.  She recently sought care with a advanced practitioner who is concerned for potential infectious process and placed her on doxycycline.  She does have GI issues and has struggled with the medications she started yesterday.  She has not experienced any fevers or chills.    This patient's past medical, social, and family history were reviewed as well as a review of systems including updates on the patient's information encounter sheet  Patient does have a history of Raynaud's    Physical Examination  Constitutional: Patient's height and weight reviewed, appears well kempt  Psychiatric: Alert and oriented ×3, appropriate mood and behavior  Pulmonary: Breathing appears nonlabored, no apparent distress  Lymphatic: No appreciable lymphadenopathy to both the upper and lower extremities  Skin: No open lesions, rashes, ulcerations  Neurologic: Gross motor and sensory exam appear intact (except for abnormalities noted in the below muscle skeletal exam)    Musculoskeletal: The left ankle demonstrates diffuse swelling and ecchymosis.  There is no evidence though of an infectious process.  She does have a small skin abrasion along the anterior lateral aspect above the level of the ankle joint which is healing  appropriately without significant surrounding erythema.  There is though some erythema along the dorsal aspect of the foot which is likely in relationship to a reaction to the hematoma/ecchymosis.  The ankle examination today demonstrates some mild laxity noted to drawer and varus stresses.  The majority of the tenderness is over the anterior talofibular ligament with only slight tenderness over the deltoid ligament    X-rays were reviewed from when she was seen in the urgent care and no apparent fracture identified    Assessment: Grade 2 sprain left ankle with significant soft tissue swelling in relationship to ecchymosis and hematoma    Plan: The patient will stop the use of the antibiotics now as there is no direct evidence of an infectious process going on.  We have suggested continue elevation and encourage foot and ankle pumps.  We have dispensed a short rocker-bottom fracture boot for her to ambulate and in a partial weightbearing fashion.  I would like to see her back in the office in 1 week for reevaluation to include x-rays.  She is aware though if she does develop any symptoms such as fevers and chills or increasing pain in the ankle she is will return to use of the antibiotics and follow-up with us immediately.    Left Ankle           Current Outpatient Medications:     ciclopirox (Penlac) 8 % solution, Apply topically once daily at bedtime. Remove once a week with nail polish remover, Disp: 192.857 mL, Rfl: 3    doxycycline (Vibramycin) 100 mg capsule, Take 1 capsule (100 mg) by mouth 2 times a day for 10 days. Take with at least 8 ounces (large glass) of water, do not lie down for 30 minutes after, Disp: 20 capsule, Rfl: 0    sacrosidase 8,500 unit/mL solution, Take 2 mL by mouth 4 times a day. (Patient not taking: Reported on 5/1/2024), Disp: 120 mL, Rfl: 11

## 2024-05-03 DIAGNOSIS — S93.402D SPRAIN OF LEFT ANKLE, UNSPECIFIED LIGAMENT, SUBSEQUENT ENCOUNTER: ICD-10-CM

## 2024-05-03 PROCEDURE — L4361 PNEUMA/VAC WALK BOOT PRE OTS: HCPCS | Performed by: ORTHOPAEDIC SURGERY

## 2024-05-08 DIAGNOSIS — S93.402D SPRAIN OF LEFT ANKLE, UNSPECIFIED LIGAMENT, SUBSEQUENT ENCOUNTER: ICD-10-CM

## 2024-05-08 DIAGNOSIS — M25.572 ACUTE LEFT ANKLE PAIN: ICD-10-CM

## 2024-05-09 ENCOUNTER — OFFICE VISIT (OUTPATIENT)
Dept: ORTHOPEDIC SURGERY | Facility: CLINIC | Age: 29
End: 2024-05-09
Payer: COMMERCIAL

## 2024-05-09 ENCOUNTER — HOSPITAL ENCOUNTER (OUTPATIENT)
Dept: RADIOLOGY | Facility: CLINIC | Age: 29
Discharge: HOME | End: 2024-05-09
Payer: COMMERCIAL

## 2024-05-09 VITALS — HEIGHT: 70 IN | BODY MASS INDEX: 19.33 KG/M2 | WEIGHT: 135 LBS

## 2024-05-09 DIAGNOSIS — S93.402D SPRAIN OF LEFT ANKLE, UNSPECIFIED LIGAMENT, SUBSEQUENT ENCOUNTER: ICD-10-CM

## 2024-05-09 DIAGNOSIS — S92.152B: Primary | ICD-10-CM

## 2024-05-09 DIAGNOSIS — M25.572 ACUTE LEFT ANKLE PAIN: ICD-10-CM

## 2024-05-09 PROCEDURE — 1036F TOBACCO NON-USER: CPT | Performed by: ORTHOPAEDIC SURGERY

## 2024-05-09 PROCEDURE — 99213 OFFICE O/P EST LOW 20 MIN: CPT | Performed by: ORTHOPAEDIC SURGERY

## 2024-05-09 PROCEDURE — 73610 X-RAY EXAM OF ANKLE: CPT | Mod: LT

## 2024-05-09 PROCEDURE — 73610 X-RAY EXAM OF ANKLE: CPT | Mod: LEFT SIDE | Performed by: RADIOLOGY

## 2024-05-09 NOTE — PROGRESS NOTES
Subjective    Patient ID: Nadia Souza is a 28 y.o. female.    Chief Complaint: Follow-up of the Left Ankle (DOI: 4/21/24)       This is a 28-year-old female seen in follow-up with regard to a left ankle pain that occurred as a result of a twisting injury while running over 2 weeks ago.  Patient has seen improvement with regard to the degree of swelling in relationship to this injury.  She has been wearing the boot that she was placed in last week.  She is not experiencing any fevers or chills and she has not noted any new redness nor warmth.  The redness and warmth she was experiencing last week has diminished.  She no longer is taking the antibiotics and she stopped 1 day after starting.    This patient's past medical, social, and family history were reviewed as well as a review of systems including updates on the patient's information encounter sheet    The patient did have a video film of the injury which I reviewed myself and while the film of the injury was from a distance this appears to be a quite significant inversion injury to the ankle.    Physical Examination  Constitutional: Patient's height and weight reviewed, appears well kempt  Psychiatric: Alert and oriented ×3, appropriate mood and behavior  Pulmonary: Breathing appears nonlabored, no apparent distress  Lymphatic: No appreciable lymphadenopathy to both the upper and lower extremities  Skin: No open lesions, rashes, ulcerations  Neurologic: Gross motor and sensory exam appear intact (except for abnormalities noted in the below muscle skeletal exam)    Musculoskeletal: The left foot is well aligned and the ankle mortise appears well reduced.  We can better examine today and while there is some laxity to drawer and varus stress the laxity would be graded at 1+ to 2/3.  There is tenderness both over the anterior talofibular ligament as well as over the deltoid ligament medially.  There is tenderness along the medial aspect of the talus.    X-rays  performed today reveal left ankle mortise is well aligned.  On the standing AP view of the ankle though there appears to be an irregularity along the medial border of the talus which could be consistent with a small avulsion fracture    Assessment: Severe left ankle sprain as a result of a twisting injury with small avulsion fracture off the inferior aspect of the talus medially    Plan: The patient will maintain in the fracture boot.  She remain out of regular work duty.  We will allow her to continue with weightbearing as tolerated.  She will begin an outpatient physical therapy program.  Return this office for reevaluation in 2 weeks to include repeat x-rays.    Left Ankle           Current Outpatient Medications:     ciclopirox (Penlac) 8 % solution, Apply topically once daily at bedtime. Remove once a week with nail polish remover, Disp: 192.857 mL, Rfl: 3    doxycycline (Vibramycin) 100 mg capsule, Take 1 capsule (100 mg) by mouth 2 times a day for 10 days. Take with at least 8 ounces (large glass) of water, do not lie down for 30 minutes after, Disp: 20 capsule, Rfl: 0    sacrosidase 8,500 unit/mL solution, Take 2 mL by mouth 4 times a day. (Patient not taking: Reported on 5/1/2024), Disp: 120 mL, Rfl: 11

## 2024-05-09 NOTE — LETTER
May 9, 2024     Patient: Nadia Souza   YOB: 1995   Date of Visit: 5/9/2024       To Whom It May Concern:    It is my medical opinion that Nadia Souza should remain out of work until further notice, will follow up in 2 weeks .    If you have any questions or concerns, please don't hesitate to call.         Sincerely,        Michael A LoPresti, MD    CC: No Recipients

## 2024-05-23 ENCOUNTER — OFFICE VISIT (OUTPATIENT)
Dept: ORTHOPEDIC SURGERY | Facility: CLINIC | Age: 29
End: 2024-05-23
Payer: COMMERCIAL

## 2024-05-23 ENCOUNTER — HOSPITAL ENCOUNTER (OUTPATIENT)
Dept: RADIOLOGY | Facility: CLINIC | Age: 29
Discharge: HOME | End: 2024-05-23
Payer: COMMERCIAL

## 2024-05-23 DIAGNOSIS — S92.152B: Primary | ICD-10-CM

## 2024-05-23 DIAGNOSIS — S93.402D SPRAIN OF LEFT ANKLE, UNSPECIFIED LIGAMENT, SUBSEQUENT ENCOUNTER: ICD-10-CM

## 2024-05-23 DIAGNOSIS — M25.572 ACUTE LEFT ANKLE PAIN: ICD-10-CM

## 2024-05-23 PROCEDURE — 99213 OFFICE O/P EST LOW 20 MIN: CPT | Performed by: ORTHOPAEDIC SURGERY

## 2024-05-23 PROCEDURE — 73610 X-RAY EXAM OF ANKLE: CPT | Mod: LEFT SIDE | Performed by: RADIOLOGY

## 2024-05-23 PROCEDURE — 1036F TOBACCO NON-USER: CPT | Performed by: ORTHOPAEDIC SURGERY

## 2024-05-23 PROCEDURE — 73610 X-RAY EXAM OF ANKLE: CPT | Mod: LT

## 2024-05-23 NOTE — PROGRESS NOTES
Subjective    Patient ID: Nadia Souza is a 28 y.o. female.    Chief Complaint: No chief complaint on file.       This is a 28-year-old female seen in follow-up with regard to her left foot and ankle injury that occurred over 3 weeks ago.  Patient recently was seen 2 weeks ago and x-rays demonstrated a nondisplaced avulsion fracture off the medial aspect of the talus.  Patient has been wearing her boot appropriately.  Has noted less pain and less swelling.  She began physical therapy yesterday.  She now is ambulating weightbearing as tolerated in the boot    This patient's past medical, social, and family history were reviewed as well as a review of systems including updates on the patient's information encounter sheet    Physical Examination  Constitutional: Patient's height and weight reviewed, appears well kempt  Psychiatric: Alert and oriented ×3, appropriate mood and behavior  Pulmonary: Breathing appears nonlabored, no apparent distress  Lymphatic: No appreciable lymphadenopathy to both the upper and lower extremities  Skin: No open lesions, rashes, ulcerations  Neurologic: Gross motor and sensory exam appear intact (except for abnormalities noted in the below muscle skeletal exam)    Musculoskeletal: The left ankle remains grossly well reduced.  There is dramatic decrease in the amount of swelling that she previously experienced as well as resolving ecchymosis.  There is tenderness along the deltoid ligament but the ankle today is stable to ligamentous examination.  There is no limitations of motion is dorsiflexion is limited to -5 degrees and plantarflexion limited to 20 degrees.    X-rays performed today reveal the left ankle mortise remains well aligned.  It appears that the fracture medially is healing appropriately with some callus formation beginning    Assessment: Healing left ankle avulsion fracture with ankle sprain    Plan: The patient may stop use of the boot and transfer to a well supported  tennis shoe.  We encourage physical therapy for range of motion.  Avoid overactivity.  May trial return to work next week on site but limit this to no more than 4 hours a day.  Follow-up [facility] 3 weeks for reevaluation.          Current Outpatient Medications:     ciclopirox (Penlac) 8 % solution, Apply topically once daily at bedtime. Remove once a week with nail polish remover, Disp: 192.857 mL, Rfl: 3    sacrosidase 8,500 unit/mL solution, Take 2 mL by mouth 4 times a day. (Patient not taking: Reported on 5/1/2024), Disp: 120 mL, Rfl: 11

## 2024-05-23 NOTE — LETTER
May 23, 2024     Patient: Nadia Souza   YOB: 1995   Date of Visit: 5/23/2024       To Whom It May Concern:    It is my medical opinion that Nadia Souza may return to work on 5/28/24 . She will only be able to work for 4 hours a day. On 6/10/24 she will be able to work an 8 hour shift.    If you have any questions or concerns, please don't hesitate to call.         Sincerely,        Michael A LoPresti, MD    CC: No Recipients

## 2024-06-13 ENCOUNTER — APPOINTMENT (OUTPATIENT)
Dept: ORTHOPEDIC SURGERY | Facility: CLINIC | Age: 29
End: 2024-06-13
Payer: COMMERCIAL

## 2024-06-13 VITALS — WEIGHT: 135 LBS | HEIGHT: 70 IN | BODY MASS INDEX: 19.33 KG/M2

## 2024-06-13 DIAGNOSIS — S93.402D SPRAIN OF LEFT ANKLE, UNSPECIFIED LIGAMENT, SUBSEQUENT ENCOUNTER: ICD-10-CM

## 2024-06-13 DIAGNOSIS — S92.152B: Primary | ICD-10-CM

## 2024-06-13 PROCEDURE — 99213 OFFICE O/P EST LOW 20 MIN: CPT | Performed by: ORTHOPAEDIC SURGERY

## 2024-06-13 NOTE — LETTER
June 17, 2024     Patient: Nadia Souza   YOB: 1995   Date of Visit: 6/13/2024       To Whom It May Concern:    It is my medical opinion that Nadia Souza  may return to work 6 hours per day 06/17/24 - 06/30/24. May return to full duty work, 8 hours per day on 07/01/24. .    If you have any questions or concerns, please don't hesitate to call.         Sincerely,        Michael A LoPresti, MD    CC: No Recipients

## 2024-06-13 NOTE — PROGRESS NOTES
Subjective    Patient ID: Nadia Souza is a 28 y.o. female.    Chief Complaint: Follow-up of the Left Ankle       28-year-old female seen in follow-up with regard to her left ankle injury that occurred over 6 weeks ago.  She has begun a physical therapy program and feels she has gone approximately 8 sessions.  She has seen gradual improvement.  She has returned to work part-time basis of 4 hours a day and tolerating this fairly well.  She now is remaining ambulatory without using assistive device.  She has noted mild to moderate stiffness in the ankle    This patient's past medical, social, and family history were reviewed as well as a review of systems including updates on the patient's information encounter sheet    Alert and oriented female seated on the exam table  No apparent distress. Normal affect and decision making. Skin intact without redness, warmth or rash. Regular respiration rate which is not labored.  The left ankle is grossly well reduced  There is mild swelling without erythema  There is more swelling laterally than there is medially  The ankle though appears stable to ligamentous examination  Tightness is noted of the Achilles tendon with dorsiflexion limited to neutral  Ambulates with a minimally antalgic gait/limp    Assessment: Improving status post left ankle sprain with small avulsion fracture off the talus    Plan: Advance with the physical therapy program.  May progress next week to work 6 hours a day and do this for 2 weeks.  In July may begin working full-time.  Return this office in 3 to 4 weeks reevaluation to include repeat x-rays of the left ankle    Left Ankle           Current Outpatient Medications:     ciclopirox (Penlac) 8 % solution, Apply topically once daily at bedtime. Remove once a week with nail polish remover, Disp: 192.857 mL, Rfl: 3    sacrosidase 8,500 unit/mL solution, Take 2 mL by mouth 4 times a day. (Patient not taking: Reported on 5/1/2024), Disp: 120 mL, Rfl:  11

## 2024-07-11 ENCOUNTER — APPOINTMENT (OUTPATIENT)
Dept: ORTHOPEDIC SURGERY | Facility: CLINIC | Age: 29
End: 2024-07-11
Payer: COMMERCIAL

## 2024-07-11 ENCOUNTER — HOSPITAL ENCOUNTER (OUTPATIENT)
Dept: RADIOLOGY | Facility: CLINIC | Age: 29
Discharge: HOME | End: 2024-07-11
Payer: COMMERCIAL

## 2024-07-11 VITALS — HEIGHT: 70 IN | BODY MASS INDEX: 19.33 KG/M2 | WEIGHT: 135 LBS

## 2024-07-11 DIAGNOSIS — S92.152D AVULSION FRACTURE OF LEFT TALUS WITH ROUTINE HEALING: ICD-10-CM

## 2024-07-11 PROCEDURE — 99213 OFFICE O/P EST LOW 20 MIN: CPT | Performed by: ORTHOPAEDIC SURGERY

## 2024-07-11 PROCEDURE — 73610 X-RAY EXAM OF ANKLE: CPT | Mod: LT

## 2024-07-11 PROCEDURE — 1036F TOBACCO NON-USER: CPT | Performed by: ORTHOPAEDIC SURGERY

## 2024-07-11 NOTE — PROGRESS NOTES
Subjective    Patient ID: Nadia Souza is a 28 y.o. female.    Chief Complaint: Follow-up of the Left Ankle       28-year-old female now 2-1/2-month status post closed management of all left ankle injury.  She sustained a lateral ligament sprain as well as a small medial avulsion off of the talus.  She is showing slow gradual improvement with her outpatient physical therapy program.  Much less pain and some improvement with regard to range of motion.  She still experiences mild swelling    This patient's past medical, social, and family history were reviewed as well as a review of systems including updates on the patient's information encounter sheet  Patient has returned to regular work duty    Alert and oriented female seated on the exam table  No apparent distress. Normal affect and decision making. Skin intact without redness, warmth or rash. Regular respiration rate which is not labored.  Left ankle is grossly well aligned  Mild to moderate swelling over the lateral aspect of the ankle joint  No significant swelling now over the medial aspect of the ankle  Stability ligamentous examination is satisfactory  Ankle dorsiflexion to 10 degrees and plantarflexion 35 degrees    X-rays performed today reveal the left ankle mortise remains well aligned.  Fracture appears to have healed appropriately    Assessment: Healed left ankle fracture with lateral ligament tears that have healed with stability    Plan: Patient may stop use of the brace.  Advance with activities as tolerated.  Likely cannot return to running and jumping activities at least for the next 2 to 3 months.  Follow-up as needed.    Left Ankle           Current Outpatient Medications:     ciclopirox (Penlac) 8 % solution, Apply topically once daily at bedtime. Remove once a week with nail polish remover, Disp: 192.857 mL, Rfl: 3    sacrosidase 8,500 unit/mL solution, Take 2 mL by mouth 4 times a day. (Patient not taking: Reported on 5/1/2024), Disp: 120  mL, Rfl: 11

## 2024-08-16 ENCOUNTER — APPOINTMENT (OUTPATIENT)
Dept: PRIMARY CARE | Facility: CLINIC | Age: 29
End: 2024-08-16
Payer: COMMERCIAL

## 2024-08-19 ENCOUNTER — APPOINTMENT (OUTPATIENT)
Dept: PRIMARY CARE | Facility: CLINIC | Age: 29
End: 2024-08-19
Payer: COMMERCIAL

## 2024-08-22 ENCOUNTER — TELEPHONE (OUTPATIENT)
Dept: PRIMARY CARE | Facility: CLINIC | Age: 29
End: 2024-08-22
Payer: COMMERCIAL

## 2024-08-22 NOTE — TELEPHONE ENCOUNTER
Pt called stating she went to [facility] on 08/16/24 for Covid.   Pt was given Prednisone for 5 days.  Today is her first day off and she has a terrible migraine.  She wants to know if this is normal when getting off med.

## 2024-08-22 NOTE — TELEPHONE ENCOUNTER
Pt has only every taken Ibuprofen for migraine.  Pt has taken already today and it did not work.  Can you suggest anything         [id number] - [place], [place] - [address] AT [place] [address] [place] [address]  Phone: [phone] Fax: [phone]

## 2024-10-24 ENCOUNTER — APPOINTMENT (OUTPATIENT)
Dept: DERMATOLOGY | Facility: CLINIC | Age: 29
End: 2024-10-24
Payer: COMMERCIAL

## 2024-10-24 DIAGNOSIS — D22.9 NEVUS: ICD-10-CM

## 2024-10-24 DIAGNOSIS — L72.3 SEBACEOUS CYST: ICD-10-CM

## 2024-10-24 DIAGNOSIS — L91.0 KELOID: Primary | ICD-10-CM

## 2024-10-24 PROCEDURE — 1036F TOBACCO NON-USER: CPT | Performed by: NURSE PRACTITIONER

## 2024-10-24 PROCEDURE — 11900 INJECT SKIN LESIONS </W 7: CPT | Performed by: NURSE PRACTITIONER

## 2024-10-24 PROCEDURE — 99213 OFFICE O/P EST LOW 20 MIN: CPT | Performed by: NURSE PRACTITIONER

## 2024-10-24 NOTE — PROGRESS NOTES
Subjective     Nadia Souza is a 29 y.o. female who presents for the following: multiple lesions.   Established patient in today for lesions to the left upper abdomen present for months, right abdomen longstanding but patient believes changed shape and right earlobe patient believes it is a keloid present for months.    Review of Systems:  No other skin or systemic complaints other than what is documented elsewhere in the note.    The following portions of the chart were reviewed this encounter and updated as appropriate:  Tobacco  Allergies  Meds  Problems  Med Hx  Surg Hx  Fam Hx       Skin Cancer History  No skin cancer on file.    Specialty Problems          Dermatology Problems    Dermatographism    Pityriasis versicolor    Eczema    Onychomycosis    Onychomycosis of great toe     Past Medical History:  Nadia Souza  has a past medical history of Body mass index (BMI) 20.0-20.9, adult (09/30/2019), Concussion with loss of consciousness of 30 minutes or less, subsequent encounter (10/11/2022), Concussion without loss of consciousness, subsequent encounter (01/14/2019), Concussion wth loss of consciousness of 30 minutes or less (10/28/2023), Head injury (10/28/2023), Impacted cerumen, right ear (09/30/2019), Other specified abnormal findings of blood chemistry (07/20/2018), Other specified health status, Personal history of diseases of the blood and blood-forming organs and certain disorders involving the immune mechanism, Personal history of diseases of the blood and blood-forming organs and certain disorders involving the immune mechanism (01/10/2019), Personal history of other (healed) physical injury and trauma (09/29/2022), Personal history of other diseases of the circulatory system, Personal history of other diseases of the respiratory system, Personal history of other endocrine, nutritional and metabolic disease (01/10/2019), Personal history of other specified conditions (09/29/2022),  Personal history of traumatic brain injury, Rash and other nonspecific skin eruption (12/06/2022), Strain of muscle, fascia and tendon at neck level, initial encounter (12/05/2018), Syncope and collapse (11/30/2022), and Unspecified asthma, uncomplicated ([facility]) (04/25/2018).    Past Surgical History:  Nadia Souza  has a past surgical history that includes Other surgical history (11/19/2018).    Family History:  Patient family history includes Alcohol abuse in an other family member; Allergic rhinitis in her maternal grandmother and mother; Anemia in her maternal grandmother; Arthritis in some other family members; Diabetes in an other family member; Hypertension in her mother; Irritable bowel syndrome in her mother; Multiple myeloma in an other family member; Multiple sclerosis in her mother; Ulcerative colitis in her maternal grandfather; cardiac disorder in an other family member; eye disorder in her paternal grandmother; hearing problem in her maternal grandmother; overweight in her maternal grandmother.    Social History:  Nadia Souza  reports that she has never smoked. She has never used smokeless tobacco. She reports that she does not currently use alcohol. She reports that she does not use drugs.    Allergies:  Pseudoephedrine and Sudafed om    Current Medications / CAM's:    Current Outpatient Medications:     ciclopirox (Penlac) 8 % solution, Apply topically once daily at bedtime. Remove once a week with nail polish remover, Disp: 192.857 mL, Rfl: 3    sacrosidase 8,500 unit/mL solution, Take 2 mL by mouth 4 times a day. (Patient not taking: Reported on 5/1/2024), Disp: 120 mL, Rfl: 11  No current facility-administered medications for this visit.     Objective   Well appearing patient in no apparent distress; mood and affect are within normal limits.      Assessment/Plan   1. Keloid  Right Inferior Helix  Fibrotic nodular plaque(s)    -Discussed nature of condition  -Discussed treatment  options  -After discussion, patient wishes to proceed with intralesional kenalog injections.    -After history, physical examination and discussion, a decision was made to proceed with intralesional kenalog therapy today  -Risks, benefits and alternatives of intralesional steroids was discussed with patient including the risk of atrophy, striae, telangiectasia, and pigmentary changes. Patient expresses understanding of these risks and verbal consent was obtained from the patient to treat with intralesional Kenalog.      Intralesional injection - Right Inferior Helix  Intralesional Injection:   Consent:     Consent obtained:  Verbal    Consent given by:  Patient    Risks discussed:  Poor cosmetic result, pain, infection and bleeding    Alternatives discussed:  No treatment  Pre-Procedure Details:     Prep Type:  Isopropyl alcohol  Procedure Details:   Injection:  Triamcinolone  Outcome: patient tolerated procedure well  Post-procedure details: sterile dressing applied and wound care instructions given  Dressing type: bandage   Comments:  A total of 0.1 ml of 5 mg/mL Kenalog were injected into 1 lesion(s)  Lot #:   Expiration:     triamcinolone acetonide (Kenalog) injection 10 mg - Right Inferior Helix      2. Nevus  Left Abdomen (side) - Lower  Uniform pigmented macule(s)/papule(s) with reassuring findings on dermoscopy    -Discussed nature of condition  -Reassurance, benign-appearing features on examination today  -Recommend continued observation    3. Sebaceous cyst  Left Abdomen (side) - Upper  Subcutaneous nodule(s) with normal-appearing overlying skin and connecting pore    -Discussed nature of the condition  -Reassurance, recommend observation at this time

## 2024-11-25 ENCOUNTER — APPOINTMENT (OUTPATIENT)
Dept: DERMATOLOGY | Facility: CLINIC | Age: 29
End: 2024-11-25
Payer: COMMERCIAL

## 2024-11-25 DIAGNOSIS — L91.0 KELOID: Primary | ICD-10-CM

## 2024-11-25 PROCEDURE — 1036F TOBACCO NON-USER: CPT | Performed by: NURSE PRACTITIONER

## 2024-11-25 NOTE — PROGRESS NOTES
Subjective     Nadia Souza is a 29 y.o. female who presents for the following: Keloid.   Established patient in for follow up on  Keloid  Right Inferior Helix.    Review of Systems:  No other skin or systemic complaints other than what is documented elsewhere in the note.    The following portions of the chart were reviewed this encounter and updated as appropriate:       Skin Cancer History  No skin cancer on file.    Specialty Problems          Dermatology Problems    Dermatographism    Pityriasis versicolor    Eczema    Onychomycosis    Onychomycosis of great toe     Past Medical History:  Nadia Souza  has a past medical history of Body mass index (BMI) 20.0-20.9, adult (09/30/2019), Concussion with loss of consciousness of 30 minutes or less, subsequent encounter (10/11/2022), Concussion without loss of consciousness, subsequent encounter (01/14/2019), Concussion wth loss of consciousness of 30 minutes or less (10/28/2023), Head injury (10/28/2023), Impacted cerumen, right ear (09/30/2019), Other specified abnormal findings of blood chemistry (07/20/2018), Other specified health status, Personal history of diseases of the blood and blood-forming organs and certain disorders involving the immune mechanism, Personal history of diseases of the blood and blood-forming organs and certain disorders involving the immune mechanism (01/10/2019), Personal history of other (healed) physical injury and trauma (09/29/2022), Personal history of other diseases of the circulatory system, Personal history of other diseases of the respiratory system, Personal history of other endocrine, nutritional and metabolic disease (01/10/2019), Personal history of other specified conditions (09/29/2022), Personal history of traumatic brain injury, Rash and other nonspecific skin eruption (12/06/2022), Strain of muscle, fascia and tendon at neck level, initial encounter (12/05/2018), Syncope and collapse (11/30/2022), and Unspecified  asthma, uncomplicated ([facility]) (04/25/2018).    Past Surgical History:  Nadia Souza  has a past surgical history that includes Other surgical history (11/19/2018).    Family History:  Patient family history includes Alcohol abuse in an other family member; Allergic rhinitis in her maternal grandmother and mother; Anemia in her maternal grandmother; Arthritis in some other family members; Diabetes in an other family member; Hypertension in her mother; Irritable bowel syndrome in her mother; Multiple myeloma in an other family member; Multiple sclerosis in her mother; Ulcerative colitis in her maternal grandfather; cardiac disorder in an other family member; eye disorder in her paternal grandmother; hearing problem in her maternal grandmother; overweight in her maternal grandmother.    Social History:  Nadia Souza  reports that she has never smoked. She has never used smokeless tobacco. She reports that she does not currently use alcohol. She reports that she does not use drugs.    Allergies:  Pseudoephedrine and Sudafed om    Current Medications / CAM's:    Current Outpatient Medications:     ciclopirox (Penlac) 8 % solution, Apply topically once daily at bedtime. Remove once a week with nail polish remover, Disp: 192.857 mL, Rfl: 3    sacrosidase 8,500 unit/mL solution, Take 2 mL by mouth 4 times a day. (Patient not taking: Reported on 5/1/2024), Disp: 120 mL, Rfl: 11     Objective   Well appearing patient in no apparent distress; mood and affect are within normal limits.      Assessment/Plan   1. Keloid  Right Inferior Helix  Fibrotic nodular plaque(s)    -Discussed nature of condition  -Discussed treatment options  -After discussion, patient wishes to proceed with intralesional kenalog injections.    -After history, physical examination and discussion, a decision was made to proceed with intralesional kenalog therapy today  -Risks, benefits and alternatives of intralesional steroids was discussed with  patient including the risk of atrophy, striae, telangiectasia, and pigmentary changes. Patient expresses understanding of these risks and verbal consent was obtained from the patient to treat with intralesional Kenalog.

## 2024-12-09 ENCOUNTER — APPOINTMENT (OUTPATIENT)
Dept: PRIMARY CARE | Facility: CLINIC | Age: 29
End: 2024-12-09
Payer: COMMERCIAL

## 2024-12-09 VITALS
BODY MASS INDEX: 19.87 KG/M2 | HEART RATE: 64 BPM | WEIGHT: 138.8 LBS | HEIGHT: 70 IN | SYSTOLIC BLOOD PRESSURE: 105 MMHG | DIASTOLIC BLOOD PRESSURE: 63 MMHG | OXYGEN SATURATION: 99 % | TEMPERATURE: 96.8 F

## 2024-12-09 DIAGNOSIS — Z00.00 HEALTHCARE MAINTENANCE: ICD-10-CM

## 2024-12-09 DIAGNOSIS — N92.6 IRREGULAR MENSES: Primary | ICD-10-CM

## 2024-12-09 LAB
NON-UH HIE A/G RATIO: 1.5
NON-UH HIE ALB: 4 G/DL (ref 3.4–5)
NON-UH HIE ALK PHOS: 54 UNIT/L (ref 45–117)
NON-UH HIE BASO COUNT: 0.06 X1000 (ref 0–0.2)
NON-UH HIE BASOS %: 1.8 %
NON-UH HIE BILIRUBIN, TOTAL: 0.8 MG/DL (ref 0.3–1.2)
NON-UH HIE BUN/CREAT RATIO: 17.8
NON-UH HIE BUN: 16 MG/DL (ref 9–23)
NON-UH HIE CALCIUM: 9.7 MG/DL (ref 8.7–10.4)
NON-UH HIE CALCULATED LDL CHOLESTEROL: 92 MG/DL (ref 60–130)
NON-UH HIE CALCULATED OSMOLALITY: 286 MOSM/KG (ref 275–295)
NON-UH HIE CHLORIDE: 109 MMOL/L (ref 98–107)
NON-UH HIE CHOLESTEROL: 164 MG/DL (ref 100–200)
NON-UH HIE CO2, VENOUS: 28 MMOL/L (ref 20–31)
NON-UH HIE CREATININE: 0.9 MG/DL (ref 0.5–0.8)
NON-UH HIE DIFF?: NO
NON-UH HIE DXH ACTIONS: ABNORMAL
NON-UH HIE EOS COUNT: 0.27 X1000 (ref 0–0.5)
NON-UH HIE EOSIN %: 7.9 %
NON-UH HIE ESTRADIOL: 126 PG/ML
NON-UH HIE FOLATE: 19.7 NG/ML (ref 5.4–17.5)
NON-UH HIE FSH: 6.3 IU/L
NON-UH HIE GFR AA: >60
NON-UH HIE GLOBULIN: 2.7 G/DL
NON-UH HIE GLOMERULAR FILTRATION RATE: >60 ML/MIN/1.73M?
NON-UH HIE GLUCOSE: 85 MG/DL (ref 74–106)
NON-UH HIE GOT: 15 UNIT/L (ref 15–37)
NON-UH HIE GPT: 11 UNIT/L (ref 10–49)
NON-UH HIE HCT: 43.5 % (ref 36–46)
NON-UH HIE HDL CHOLESTEROL: 65 MG/DL (ref 40–60)
NON-UH HIE HGB: 14.2 G/DL (ref 12–16)
NON-UH HIE INSTR WBC: 3.4
NON-UH HIE IRON: 178 UG/DL (ref 50–170)
NON-UH HIE K: 4.2 MMOL/L (ref 3.5–5.1)
NON-UH HIE LUTEINIZING HORMONE: 9.2 IU/L
NON-UH HIE LYMPH %: 39 %
NON-UH HIE LYMPH COUNT: 1.32 X1000 (ref 1.2–4.8)
NON-UH HIE MCH: 31.3 PG (ref 27–34)
NON-UH HIE MCHC: 32.5 G/DL (ref 32–37)
NON-UH HIE MCV: 96.3 FL (ref 80–100)
NON-UH HIE MONO %: 10.2 %
NON-UH HIE MONO COUNT: 0.35 X1000 (ref 0.1–1)
NON-UH HIE MPV: 12.3 FL (ref 7.4–10.4)
NON-UH HIE NA: 143 MMOL/L (ref 135–145)
NON-UH HIE NEUTROPHIL %: 41 %
NON-UH HIE NEUTROPHIL COUNT (ANC): 1.39 X1000 (ref 1.4–8.8)
NON-UH HIE NUCLEATED RBC: 0 /100WBC
NON-UH HIE PLATELET: 176 X10 (ref 150–450)
NON-UH HIE RBC: 4.52 X10 (ref 4.2–5.4)
NON-UH HIE RDW: 13.3 % (ref 11.5–14.5)
NON-UH HIE SATURATION: 54.9 % (ref 20–50)
NON-UH HIE TIBC: 324 UG/ML (ref 250–425)
NON-UH HIE TOTAL CHOL/HDL CHOL RATIO: 2.5
NON-UH HIE TOTAL PROTEIN: 6.7 G/DL (ref 5.7–8.2)
NON-UH HIE TRIGLYCERIDES: 35 MG/DL (ref 30–150)
NON-UH HIE TSH: 1.32 UIU/ML (ref 0.55–4.78)
NON-UH HIE VITAMIN B12: 653 PG/ML (ref 211–911)
NON-UH HIE WBC: 3.4 X10 (ref 4.5–11)

## 2024-12-09 PROCEDURE — 99395 PREV VISIT EST AGE 18-39: CPT | Performed by: INTERNAL MEDICINE

## 2024-12-09 PROCEDURE — 1036F TOBACCO NON-USER: CPT | Performed by: INTERNAL MEDICINE

## 2024-12-09 PROCEDURE — 3008F BODY MASS INDEX DOCD: CPT | Performed by: INTERNAL MEDICINE

## 2024-12-09 ASSESSMENT — ENCOUNTER SYMPTOMS
FEVER: 0
BACK PAIN: 0
CHILLS: 0
FATIGUE: 1
NERVOUS/ANXIOUS: 1
ABDOMINAL PAIN: 0
DIARRHEA: 0
SHORTNESS OF BREATH: 0
SORE THROAT: 0
CONFUSION: 0
ARTHRALGIAS: 0
DIZZINESS: 0
CONSTIPATION: 0
PALPITATIONS: 0
LIGHT-HEADEDNESS: 0
HEADACHES: 1
COUGH: 0
NAUSEA: 0
VOMITING: 0

## 2024-12-09 ASSESSMENT — PATIENT HEALTH QUESTIONNAIRE - PHQ9
SUM OF ALL RESPONSES TO PHQ9 QUESTIONS 1 AND 2: 0
2. FEELING DOWN, DEPRESSED OR HOPELESS: NOT AT ALL
1. LITTLE INTEREST OR PLEASURE IN DOING THINGS: NOT AT ALL

## 2024-12-09 NOTE — PROGRESS NOTES
CHIEF COMPLAINT  Annual Exam    HISTORY OF PRESENT ILLNESS  Nadia Souza is a 29 y.o. female presents today for follow up of Annual Exam    HPI    History reviewed and updated.  Periods have been more irregular lately, sometimes a later than normal / longer cycle.  Denies heavy menses.  Not sexually active, no chance of pregnancy.  Has not had Pap test to date.    Follows special diet due to difficulty digesting sucrose.  Does not eat fruit.  Eats some veggies and potatoes.  Feels tired a lot, sometimes has headaches.   Working with trauma counselor on her mood also.       REVIEW OF SYSTEMS  Review of Systems   Constitutional:  Positive for fatigue. Negative for chills and fever.   HENT:  Negative for sore throat.    Respiratory:  Negative for cough and shortness of breath.    Cardiovascular:  Negative for chest pain, palpitations and leg swelling.   Gastrointestinal:  Negative for abdominal pain, constipation, diarrhea, nausea and vomiting.   Genitourinary:  Positive for menstrual problem.   Musculoskeletal:  Negative for arthralgias, back pain and gait problem.   Skin:  Negative for rash.   Neurological:  Positive for headaches. Negative for dizziness and light-headedness.   Psychiatric/Behavioral:  Negative for confusion. The patient is nervous/anxious.        ALLERGIES  Pseudoephedrine and Sudafed om    MEDICATIONS  Current Outpatient Medications   Medication Instructions    ciclopirox (Penlac) 8 % solution Topical, Nightly, Remove once a week with nail polish remover    sacrosidase 8,500 unit/mL solution 2 mL, oral, 4 times daily       TOBACCO USE  Social History     Tobacco Use   Smoking Status Never   Smokeless Tobacco Never       DEPRESSION SCREEN  Over the past 2 weeks, how often have you been bothered by any of the following problems?  Little interest or pleasure in doing things: Not at all  Feeling down, depressed, or hopeless: Not at all    SURGICAL HISTORY  Past Surgical History:  11/19/2018: OTHER  "SURGICAL HISTORY      Comment:  No history of surgery       OBJECTIVE    /63   Pulse 64   Temp 36 °C (96.8 °F)   Ht 1.778 m (5' 10\")   Wt 63 kg (138 lb 12.8 oz)   SpO2 99%   BMI 19.92 kg/m²    BMI: Estimated body mass index is 19.92 kg/m² as calculated from the following:    Height as of this encounter: 1.778 m (5' 10\").    Weight as of this encounter: 63 kg (138 lb 12.8 oz).    BP Readings from Last 3 Encounters:   12/09/24 105/63   08/16/24 104/65   08/07/24 101/66      Wt Readings from Last 3 Encounters:   12/09/24 63 kg (138 lb 12.8 oz)   08/16/24 63.5 kg (139 lb 15.9 oz)   08/07/24 63.5 kg (139 lb 15.9 oz)        PHYSICAL EXAM  Physical Exam  Constitutional:       Appearance: Normal appearance.   HENT:      Head: Normocephalic and atraumatic.      Right Ear: Tympanic membrane and ear canal normal.      Left Ear: Tympanic membrane and ear canal normal.      Mouth/Throat:      Mouth: Mucous membranes are moist.      Pharynx: Oropharynx is clear. No oropharyngeal exudate or posterior oropharyngeal erythema.   Cardiovascular:      Rate and Rhythm: Normal rate and regular rhythm.      Pulses: Normal pulses.      Heart sounds: No murmur heard.  Pulmonary:      Effort: Pulmonary effort is normal. No respiratory distress.      Breath sounds: Normal breath sounds. No wheezing.   Abdominal:      General: There is no distension.      Palpations: Abdomen is soft.      Tenderness: There is no abdominal tenderness.   Musculoskeletal:      Right lower leg: No edema.      Left lower leg: No edema.   Skin:     Findings: No rash.   Neurological:      General: No focal deficit present.      Mental Status: She is alert and oriented to person, place, and time. Mental status is at baseline.      Gait: Gait normal.   Psychiatric:         Mood and Affect: Mood normal.         Behavior: Behavior normal.         Thought Content: Thought content normal.         Judgment: Judgment normal.          ASSESSMENT AND " PLAN  Assessment/Plan   Problem List Items Addressed This Visit       Healthcare maintenance    Relevant Orders    Lipid Panel    Comprehensive Metabolic Panel    CBC and Auto Differential    Vitamin B12    Folate    Iron and TIBC    Referral to Gynecology    Irregular menses - Primary    Relevant Orders    TSH with reflex to Free T4 if abnormal    FSH & LH    Estradiol     Well  Visit     BP is normal.  Healthy habits reviewed and recommended.    Routine fasting labs - CBC, CMP, FLP.  Given menstrual issues, check labs.  Fatigue, at risk for nutritional deficiency due to restricted diet - check labs.     Consider seeing GYN for Pap test.      Reviewed signs of skin cancer and importance of sun protection.  - seeing dermatologist for treatment of keloid scar.      Stay up to date with routine dental and eye exams.    Had flu shot at work.     Follow-up annually, sooner pending results.

## 2024-12-31 ENCOUNTER — APPOINTMENT (OUTPATIENT)
Dept: DERMATOLOGY | Facility: CLINIC | Age: 29
End: 2024-12-31
Payer: COMMERCIAL

## 2024-12-31 DIAGNOSIS — H00.011 HORDEOLUM EXTERNUM OF RIGHT UPPER EYELID: Primary | ICD-10-CM

## 2024-12-31 DIAGNOSIS — L91.0 KELOID: ICD-10-CM

## 2024-12-31 PROCEDURE — 99213 OFFICE O/P EST LOW 20 MIN: CPT | Performed by: NURSE PRACTITIONER

## 2024-12-31 PROCEDURE — 11900 INJECT SKIN LESIONS </W 7: CPT | Performed by: NURSE PRACTITIONER

## 2024-12-31 RX ORDER — TRIAMCINOLONE ACETONIDE 40 MG/ML
40 INJECTION, SUSPENSION INTRA-ARTICULAR; INTRAMUSCULAR ONCE
Status: COMPLETED | OUTPATIENT
Start: 2024-12-31 | End: 2024-12-31

## 2024-12-31 RX ORDER — CEPHALEXIN 500 MG/1
500 CAPSULE ORAL 2 TIMES DAILY
Qty: 14 CAPSULE | Refills: 0 | Status: SHIPPED | OUTPATIENT
Start: 2024-12-31 | End: 2025-01-07

## 2024-12-31 RX ADMIN — TRIAMCINOLONE ACETONIDE 40 MG: 40 INJECTION, SUSPENSION INTRA-ARTICULAR; INTRAMUSCULAR at 15:10

## 2024-12-31 NOTE — PROGRESS NOTES
Subjective     Nadia Souza is a 29 y.o. female who presents for the following: Keloid.   Established patient in for follow up on Keloid  Right Inferior Helix- last treated with ILK 11/2024.     Review of Systems:  No other skin or systemic complaints other than what is documented elsewhere in the note.    The following portions of the chart were reviewed this encounter and updated as appropriate:       Skin Cancer History  No skin cancer on file.    Specialty Problems          Dermatology Problems    Dermatographism    Pityriasis versicolor    Eczema    Onychomycosis    Onychomycosis of great toe     Past Medical History:  Nadia Souza  has a past medical history of Body mass index (BMI) 20.0-20.9, adult (09/30/2019), Concussion with loss of consciousness of 30 minutes or less, subsequent encounter (10/11/2022), Concussion without loss of consciousness, subsequent encounter (01/14/2019), Concussion wth loss of consciousness of 30 minutes or less (10/28/2023), Head injury (10/28/2023), Impacted cerumen, right ear (09/30/2019), Other specified abnormal findings of blood chemistry (07/20/2018), Other specified health status, Personal history of diseases of the blood and blood-forming organs and certain disorders involving the immune mechanism, Personal history of diseases of the blood and blood-forming organs and certain disorders involving the immune mechanism (01/10/2019), Personal history of other (healed) physical injury and trauma (09/29/2022), Personal history of other diseases of the circulatory system, Personal history of other diseases of the respiratory system, Personal history of other endocrine, nutritional and metabolic disease (01/10/2019), Personal history of other specified conditions (09/29/2022), Personal history of traumatic brain injury, Rash and other nonspecific skin eruption (12/06/2022), Strain of muscle, fascia and tendon at neck level, initial encounter (12/05/2018), Syncope and  collapse (11/30/2022), and Unspecified asthma, uncomplicated ([facility]) (04/25/2018).    Past Surgical History:  Nadia Souza  has a past surgical history that includes Other surgical history (11/19/2018).    Family History:  Patient family history includes Alcohol abuse in an other family member; Allergic rhinitis in her maternal grandmother and mother; Anemia in her maternal grandmother; Arthritis in some other family members; Diabetes in an other family member; Hypertension in her mother; Irritable bowel syndrome in her mother; Multiple myeloma in an other family member; Multiple sclerosis in her mother; Ulcerative colitis in her maternal grandfather; cardiac disorder in an other family member; eye disorder in her paternal grandmother; hearing problem in her maternal grandmother; overweight in her maternal grandmother.    Social History:  Nadia Souza  reports that she has never smoked. She has never used smokeless tobacco. She reports that she does not currently use alcohol. She reports that she does not use drugs.    Allergies:  Pseudoephedrine and Sudafed om    Current Medications / CAM's:    Current Outpatient Medications:     cephalexin (Keflex) 500 mg capsule, Take 1 capsule (500 mg) by mouth 2 times a day for 7 days., Disp: 14 capsule, Rfl: 0    ciclopirox (Penlac) 8 % solution, Apply topically once daily at bedtime. Remove once a week with nail polish remover, Disp: 192.857 mL, Rfl: 3  No current facility-administered medications for this visit.     Objective   Well appearing patient in no apparent distress; mood and affect are within normal limits.      Assessment/Plan   1. Hordeolum externum of right upper eyelid  Right Upper Eyelid    cephalexin (Keflex) 500 mg capsule - Right Upper Eyelid  Take 1 capsule (500 mg) by mouth 2 times a day for 7 days.    2. Keloid  Right Inferior Helix  Fibrotic nodular plaque(s)    -Discussed nature of condition  -Discussed treatment options  -After discussion,  patient wishes to proceed with intralesional kenalog injections.    -After history, physical examination and discussion, a decision was made to proceed with intralesional kenalog therapy today  -Risks, benefits and alternatives of intralesional steroids was discussed with patient including the risk of atrophy, striae, telangiectasia, and pigmentary changes. Patient expresses understanding of these risks and verbal consent was obtained from the patient to treat with intralesional Kenalog.

## 2025-02-04 ENCOUNTER — APPOINTMENT (OUTPATIENT)
Dept: OBSTETRICS AND GYNECOLOGY | Facility: CLINIC | Age: 30
End: 2025-02-04
Payer: COMMERCIAL

## 2025-02-04 VITALS — BODY MASS INDEX: 18.9 KG/M2 | HEIGHT: 70 IN | WEIGHT: 132 LBS

## 2025-02-04 DIAGNOSIS — Z01.419 WELL WOMAN EXAM: Primary | ICD-10-CM

## 2025-02-04 DIAGNOSIS — Z00.00 HEALTHCARE MAINTENANCE: ICD-10-CM

## 2025-02-04 PROCEDURE — 99385 PREV VISIT NEW AGE 18-39: CPT | Performed by: OBSTETRICS & GYNECOLOGY

## 2025-02-04 PROCEDURE — 1036F TOBACCO NON-USER: CPT | Performed by: OBSTETRICS & GYNECOLOGY

## 2025-02-04 PROCEDURE — 88175 CYTOPATH C/V AUTO FLUID REDO: CPT

## 2025-02-04 PROCEDURE — 3008F BODY MASS INDEX DOCD: CPT | Performed by: OBSTETRICS & GYNECOLOGY

## 2025-02-04 ASSESSMENT — PAIN SCALES - GENERAL: PAINLEVEL_OUTOF10: 0-NO PAIN

## 2025-02-04 NOTE — PROGRESS NOTES
"Nadia Souza is a 29 y.o. female who is here for a routine exam. 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[facility]  Getting  this year June    Menses : q 27-35 days  Contraception : NSA  HPV vaccine : No  Last pap : never  Last HPV : never  History of abnormal pap : No  Last mammogram : never  History of abnormal mammogram : No  Colon cancer screen : never    ROS  systems reviewed, anything negative noted in HPI    bladder: no dysuria, gross hematuria, urinary frequency, urgency or incontinence  breast: no lumps, nipple d/c, overlying skin changes, redness, or skin retraction    [unfilled]    Past med hx and past surg hx reviewed and notable for:     Objective   Ht 1.778 m (5' 10\")   Wt 59.9 kg (132 lb)   LMP 01/29/2025 (Exact Date)   BMI 18.94 kg/m²      General:   Alert and oriented, in no acute distress   Neck: Supple. No visible thyromegaly.    Breast/Axilla: Normal to palpation bilaterally without masses, skin changes, lymphadenopathy, or nipple discharge.    Abdomen: Soft, non-tender, without masses or organomegaly   Vulva:  Urethra: Normal architecture without erythema, masses, or lesions.   Normal    Vagina: Normal mucosa without lesions, masses, or atrophy. No abnormal vaginal discharge.    Cervix: Normal without masses, lesions, or signs of cervicitis.    Uterus: Normal mobile, non-enlarged uterus    Adnexa: Normal without masses or lesions   Pelvic Floor: No POP noted.    Psych:  Anus/Perineum: Normal affect. Normal mood.   Normal without masses or lesions      Thank you for coming to your annual exam. Your findings during the exam were normal. Specific topics addressed during this exam included: healthy lifestyle, well woman screening guidelines,     Actions performed during this visit include:  - Clinical breast exam  - Clinical pelvic exam  - pap  - will use condoms prn  No orders of the defined types were placed in this encounter.          Please return for your next visit in 1 " year.

## 2025-02-06 ENCOUNTER — APPOINTMENT (OUTPATIENT)
Dept: DERMATOLOGY | Facility: CLINIC | Age: 30
End: 2025-02-06
Payer: COMMERCIAL

## 2025-02-06 DIAGNOSIS — L91.0 KELOID: Primary | ICD-10-CM

## 2025-02-06 DIAGNOSIS — H00.11 CHALAZION OF RIGHT UPPER EYELID: ICD-10-CM

## 2025-02-06 PROCEDURE — 1036F TOBACCO NON-USER: CPT | Performed by: NURSE PRACTITIONER

## 2025-02-06 PROCEDURE — 11900 INJECT SKIN LESIONS </W 7: CPT | Performed by: NURSE PRACTITIONER

## 2025-02-06 PROCEDURE — 99213 OFFICE O/P EST LOW 20 MIN: CPT | Performed by: NURSE PRACTITIONER

## 2025-02-06 RX ORDER — TRIAMCINOLONE ACETONIDE 40 MG/ML
40 INJECTION, SUSPENSION INTRA-ARTICULAR; INTRAMUSCULAR ONCE
Status: COMPLETED | OUTPATIENT
Start: 2025-02-06 | End: 2025-02-06

## 2025-02-06 RX ADMIN — TRIAMCINOLONE ACETONIDE 40 MG: 40 INJECTION, SUSPENSION INTRA-ARTICULAR; INTRAMUSCULAR at 15:08

## 2025-02-06 NOTE — PROGRESS NOTES
Subjective     Nadia Souza is a 29 y.o. female who presents for the following: Keloid.     Review of Systems:  No other skin or systemic complaints other than what is documented elsewhere in the note.    The following portions of the chart were reviewed this encounter and updated as appropriate:          Skin Cancer History  No skin cancer on file.      Specialty Problems          Dermatology Problems    Dermatographism    Pityriasis versicolor    Eczema    Onychomycosis    Onychomycosis of great toe        Objective   Well appearing patient in no apparent distress; mood and affect are within normal limits.    A focused skin examination was performed. All findings within normal limits unless otherwise noted below.    Assessment/Plan   1. Keloid  Right Inferior Helix  Fibrotic nodular plaque(s)    -Discussed nature of condition  -Discussed treatment options  -After discussion, patient wishes to proceed with intralesional kenalog injections.    -After history, physical examination and discussion, a decision was made to proceed with intralesional kenalog therapy today  -Risks, benefits and alternatives of intralesional steroids was discussed with patient including the risk of atrophy, striae, telangiectasia, and pigmentary changes. Patient expresses understanding of these risks and verbal consent was obtained from the patient to treat with intralesional Kenalog.      2. Chalazion of right upper eyelid  Right Upper Eyelid  Residual, non painful pink papule previously treated with Keflex 500mg BID x1 week.  Patient states it has improved, but is not completely resolved    Long-term eyelid hygiene consisting of daily eyelid washes with diluted baby shampoo or commercial lid scrubs / wipes are recommended prophylaxis in addition  minimizing use of makeup and ensuring complete makeup removal.    Frequent, very warm compresses for 10-14 days are the mainstay of care.    If it fails to improve, then I recommend she see  her regular ophthalmologist

## 2025-02-06 NOTE — PROGRESS NOTES
Subjective     Nadia Souza is a 29 y.o. female who presents for the following: Keloid.   Established patient in for follow up last seen for Keloid  Right Inferior Helix treated with ILK injection.    Review of Systems:  No other skin or systemic complaints other than what is documented elsewhere in the note.    The following portions of the chart were reviewed this encounter and updated as appropriate:       Skin Cancer History  No skin cancer on file.    Specialty Problems          Dermatology Problems    Dermatographism    Pityriasis versicolor    Eczema    Onychomycosis    Onychomycosis of great toe     Past Medical History:  Nadia Souza  has a past medical history of Body mass index (BMI) 20.0-20.9, adult (09/30/2019), Concussion with loss of consciousness of 30 minutes or less, subsequent encounter (10/11/2022), Concussion without loss of consciousness, subsequent encounter (01/14/2019), Concussion wth loss of consciousness of 30 minutes or less (10/28/2023), Head injury (10/28/2023), Impacted cerumen, right ear (09/30/2019), Other specified abnormal findings of blood chemistry (07/20/2018), Other specified health status, Personal history of diseases of the blood and blood-forming organs and certain disorders involving the immune mechanism, Personal history of diseases of the blood and blood-forming organs and certain disorders involving the immune mechanism (01/10/2019), Personal history of other (healed) physical injury and trauma (09/29/2022), Personal history of other diseases of the circulatory system, Personal history of other diseases of the respiratory system, Personal history of other endocrine, nutritional and metabolic disease (01/10/2019), Personal history of other specified conditions (09/29/2022), Personal history of traumatic brain injury, Rash and other nonspecific skin eruption (12/06/2022), Strain of muscle, fascia and tendon at neck level, initial encounter (12/05/2018), Syncope and  collapse (11/30/2022), and Unspecified asthma, uncomplicated ([facility]) (04/25/2018).    Past Surgical History:  Nadia Souza  has a past surgical history that includes Other surgical history (11/19/2018).    Family History:  Patient family history includes Alcohol abuse in an other family member; Allergic rhinitis in her maternal grandmother and mother; Anemia in her maternal grandmother; Arthritis in some other family members; Diabetes in an other family member; Hypertension in her mother; Irritable bowel syndrome in her mother; Multiple myeloma in an other family member; Multiple sclerosis in her mother; Ulcerative colitis in her maternal grandfather; cardiac disorder in an other family member; eye disorder in her paternal grandmother; hearing problem in her maternal grandmother; overweight in her maternal grandmother.    Social History:  Nadia Souza  reports that she has never smoked. She has never used smokeless tobacco. She reports that she does not currently use alcohol. She reports that she does not use drugs.    Allergies:  Pseudoephedrine and Sudafed om    Current Medications / CAM's:    Current Outpatient Medications:     ciclopirox (Penlac) 8 % solution, Apply topically once daily at bedtime. Remove once a week with nail polish remover, Disp: 192.857 mL, Rfl: 3  No current facility-administered medications for this visit.     Objective   Well appearing patient in no apparent distress; mood and affect are within normal limits.      Assessment/Plan   1. Keloid  Right Inferior Helix  Fibrotic nodular plaque(s)    -Discussed nature of condition  -Discussed treatment options  -After discussion, patient wishes to proceed with intralesional kenalog injections.    -After history, physical examination and discussion, a decision was made to proceed with intralesional kenalog therapy today  -Risks, benefits and alternatives of intralesional steroids was discussed with patient including the risk of atrophy,  striae, telangiectasia, and pigmentary changes. Patient expresses understanding of these risks and verbal consent was obtained from the patient to treat with intralesional Kenalog.      2. Chalazion of right upper eyelid  Right Upper Eyelid  Residual, non painful pink papule previously treated with Keflex 500mg BID x1 week.  Patient states it has improved, but is not completely resolved    Long-term eyelid hygiene consisting of daily eyelid washes with diluted baby shampoo or commercial lid scrubs / wipes are recommended prophylaxis in addition  minimizing use of makeup and ensuring complete makeup removal.    Frequent, very warm compresses for 10-14 days are the mainstay of care.    If it fails to improve, then I recommend she see her regular ophthalmologist

## 2025-02-06 NOTE — PROGRESS NOTES
Subjective     Nadia Souza is a 29 y.o. female who presents for the following: Keloid.     Review of Systems:  No other skin or systemic complaints other than what is documented elsewhere in the note.    The following portions of the chart were reviewed this encounter and updated as appropriate:         Skin Cancer History  No skin cancer on file.      Specialty Problems          Dermatology Problems    Dermatographism    Pityriasis versicolor    Eczema    Onychomycosis    Onychomycosis of great toe        Objective   Well appearing patient in no apparent distress; mood and affect are within normal limits.    A full examination was performed including scalp, head, eyes, ears, nose, lips, neck, chest, axillae, abdomen, back, buttocks, bilateral upper extremities, bilateral lower extremities, hands, feet, fingers, toes, fingernails, and toenails. All findings within normal limits unless otherwise noted below.    Assessment/Plan   1. Keloid  Right Inferior Helix  Fibrotic nodular plaque(s)    -Discussed nature of condition  -Discussed treatment options  -After discussion, patient wishes to proceed with intralesional kenalog injections.    -After history, physical examination and discussion, a decision was made to proceed with intralesional kenalog therapy today  -Risks, benefits and alternatives of intralesional steroids was discussed with patient including the risk of atrophy, striae, telangiectasia, and pigmentary changes. Patient expresses understanding of these risks and verbal consent was obtained from the patient to treat with intralesional Kenalog.      2. Chalazion of right upper eyelid  Right Upper Eyelid  Residual, non painful pink papule previously treated with Keflex 500mg BID x1 week.  Patient states it has improved, but is not completely resolved    Long-term eyelid hygiene consisting of daily eyelid washes with diluted baby shampoo or commercial lid scrubs / wipes are recommended prophylaxis in  addition  minimizing use of makeup and ensuring complete makeup removal.    Frequent, very warm compresses for 10-14 days are the mainstay of care.    If it fails to improve, then I recommend she see her regular ophthalmologist

## 2025-02-13 LAB
CYTOLOGY CMNT CVX/VAG CYTO-IMP: NORMAL
LAB AP HPV GENOTYPE QUESTION: YES
LAB AP HPV HR: NORMAL
LABORATORY COMMENT REPORT: NORMAL
LMP START DATE: NORMAL
PATH REPORT.TOTAL CANCER: NORMAL

## 2025-03-06 ENCOUNTER — APPOINTMENT (OUTPATIENT)
Dept: DERMATOLOGY | Facility: CLINIC | Age: 30
End: 2025-03-06
Payer: COMMERCIAL

## 2025-03-06 DIAGNOSIS — L91.0 KELOID: Primary | ICD-10-CM

## 2025-03-06 PROCEDURE — 11900 INJECT SKIN LESIONS </W 7: CPT | Performed by: NURSE PRACTITIONER

## 2025-03-06 RX ORDER — TRIAMCINOLONE ACETONIDE 40 MG/ML
40 INJECTION, SUSPENSION INTRA-ARTICULAR; INTRAMUSCULAR ONCE
Status: COMPLETED | OUTPATIENT
Start: 2025-03-06 | End: 2025-03-06

## 2025-03-06 RX ADMIN — TRIAMCINOLONE ACETONIDE 40 MG: 40 INJECTION, SUSPENSION INTRA-ARTICULAR; INTRAMUSCULAR at 15:27

## 2025-03-06 NOTE — PROGRESS NOTES
Subjective     Nadia Souza is a 29 y.o. female who presents for the following: Keloid (Located right inferior helix/LV 02/06/25 ILK 40 injected/Says that the first two injections helped, the last one seemed to make it more prominent in front).     Review of Systems:  No other skin or systemic complaints other than what is documented elsewhere in the note.    The following portions of the chart were reviewed this encounter and updated as appropriate:          Skin Cancer History  No skin cancer on file.      Specialty Problems          Dermatology Problems    Dermatographism    Pityriasis versicolor    Eczema    Onychomycosis    Onychomycosis of great toe        Objective   Well appearing patient in no apparent distress; mood and affect are within normal limits.    A focused skin examination was performed. All findings within normal limits unless otherwise noted below.    Assessment/Plan   1. Keloid  Right Inferior Helix  Fibrotic nodular plaque(s)    -Discussed nature of condition  -Discussed treatment options  -After discussion, patient wishes to proceed with intralesional kenalog injections.    -After history, physical examination and discussion, a decision was made to proceed with intralesional kenalog therapy today  -Risks, benefits and alternatives of intralesional steroids was discussed with patient including the risk of atrophy, striae, telangiectasia, and pigmentary changes. Patient expresses understanding of these risks and verbal consent was obtained from the patient to treat with intralesional Kenalog.      triamcinolone acetonide (Kenalog-40) injection 40 mg - Right Inferior Helix

## 2025-04-04 ENCOUNTER — APPOINTMENT (OUTPATIENT)
Dept: DERMATOLOGY | Facility: CLINIC | Age: 30
End: 2025-04-04
Payer: COMMERCIAL

## 2025-04-08 ENCOUNTER — TELEPHONE (OUTPATIENT)
Dept: PRIMARY CARE | Facility: CLINIC | Age: 30
End: 2025-04-08
Payer: COMMERCIAL

## 2025-04-08 ENCOUNTER — OFFICE VISIT (OUTPATIENT)
Dept: URGENT CARE | Age: 30
End: 2025-04-08
Payer: COMMERCIAL

## 2025-04-08 VITALS
TEMPERATURE: 97.8 F | SYSTOLIC BLOOD PRESSURE: 105 MMHG | RESPIRATION RATE: 16 BRPM | HEART RATE: 89 BPM | WEIGHT: 133 LBS | OXYGEN SATURATION: 95 % | DIASTOLIC BLOOD PRESSURE: 68 MMHG | BODY MASS INDEX: 19.08 KG/M2

## 2025-04-08 DIAGNOSIS — J01.10 ACUTE NON-RECURRENT FRONTAL SINUSITIS: Primary | ICD-10-CM

## 2025-04-08 DIAGNOSIS — J34.9 SINUS PROBLEM: ICD-10-CM

## 2025-04-08 LAB
POC CORONAVIRUS SARS-COV-2 PCR: NEGATIVE
POC HUMAN RHINOVIRUS PCR: NEGATIVE
POC INFLUENZA A VIRUS PCR: NEGATIVE
POC INFLUENZA B VIRUS PCR: NEGATIVE
POC RESPIRATORY SYNCYTIAL VIRUS PCR: NEGATIVE

## 2025-04-08 PROCEDURE — 99214 OFFICE O/P EST MOD 30 MIN: CPT

## 2025-04-08 PROCEDURE — 1036F TOBACCO NON-USER: CPT

## 2025-04-08 PROCEDURE — 87631 RESP VIRUS 3-5 TARGETS: CPT

## 2025-04-08 RX ORDER — AMOXICILLIN AND CLAVULANATE POTASSIUM 875; 125 MG/1; MG/1
875 TABLET, FILM COATED ORAL 2 TIMES DAILY
Qty: 20 TABLET | Refills: 0 | Status: SHIPPED | OUTPATIENT
Start: 2025-04-08

## 2025-04-08 RX ORDER — FLUTICASONE PROPIONATE 50 MCG
1 SPRAY, SUSPENSION (ML) NASAL DAILY
Qty: 16 G | Refills: 0 | Status: SHIPPED | OUTPATIENT
Start: 2025-04-08 | End: 2026-04-08

## 2025-04-08 ASSESSMENT — ENCOUNTER SYMPTOMS
CONSTIPATION: 0
WHEEZING: 0
DIZZINESS: 0
SHORTNESS OF BREATH: 0
FATIGUE: 1
RHINORRHEA: 0
SWOLLEN GLANDS: 1
HEADACHES: 1
VERTIGO: 0
SINUS PAIN: 1
COUGH: 0
FEVER: 0
LIGHT-HEADEDNESS: 1
HOARSE VOICE: 0
SINUS PRESSURE: 1
SORE THROAT: 0
NAUSEA: 0
CHILLS: 0
SNORING: 0
VOMITING: 0

## 2025-04-08 NOTE — TELEPHONE ENCOUNTER
Pt is having symptoms of getting overheated easily. Also fatigue and headache, low energy.  No fever.   Two weeks ago had sore throat and fatigue and headache. No drainage.  Feels like it could be a seasonal thing. Allergies?  Did not test for Covid.   Pt wants to know what she can do.  Pt has been taking Ibuprofen, and sinus and migraine medication.  That helped in the last week or two but not now.      [facility]/pharmacy #[id number] - [place], [place] - [address] AT [facility]  [address] [place] [address]  Phone: [phone] Fax: [phone]

## 2025-04-08 NOTE — PROGRESS NOTES
Subjective   Patient ID: Nadia Souza is a 29 y.o. female. They present today with a chief complaint of Sinus Problem and Fatigue.    History of Present Illness  29 year old female presents with complaint of sinus congestion/pressure, HA, fatigue, sweats. Symptoms started with URI on 03/20/2025. Worsening of sinus pressure over the past 24 hours. Has been taking Tylenol and sinus decongestant with minimal relief.       Sinusitis  Pain details:     Location:  Frontal    Quality:  Aching and pressure    Severity:  Moderate    Timing:  Constant  Progression:  Worsening  Chronicity:  New  Context: recent URI    Context: not allergies, not chemical odor, not deviated nasal septum and not smoke inhalation    Relieved by:  Oral decongestants and acetaminophen  Worsened by:  Position changes  Associated symptoms: congestion, fatigue, headaches and swollen glands    Associated symptoms: no chest pain, no chills, no cough, no ear pain, no fever, no hoarse voice, no mouth breathing, no nausea, no rhinorrhea, no shortness of breath, no sneezing, no snoring, no sore throat, no tooth pain, no vertigo, no vomiting and no wheezing        Past Medical History  Allergies as of 04/08/2025 - Reviewed 04/08/2025   Allergen Reaction Noted    Pseudoephedrine Anaphylaxis, Dizziness, Hives, and Swelling 02/05/2020    Sudafed om Unknown 01/22/2020       (Not in a hospital admission)       Past Medical History:   Diagnosis Date    Body mass index (BMI) 20.0-20.9, adult 09/30/2019    Body mass index (BMI) of 20.0 to 20.9 in adult    Concussion with loss of consciousness of 30 minutes or less, subsequent encounter 10/11/2022    Concussion with loss of consciousness of 30 minutes or less, subsequent encounter    Concussion without loss of consciousness, subsequent encounter 01/14/2019    Concussion without loss of consciousness, subsequent encounter    Concussion wth loss of consciousness of 30 minutes or less 10/28/2023    Head injury  10/28/2023    Impacted cerumen, right ear 09/30/2019    Excessive cerumen in right ear canal    Other specified abnormal findings of blood chemistry 07/20/2018    Abnormal CBC    Other specified health status     No pertinent past surgical history    Personal history of diseases of the blood and blood-forming organs and certain disorders involving the immune mechanism     History of anemia    Personal history of diseases of the blood and blood-forming organs and certain disorders involving the immune mechanism 01/10/2019    History of anemia    Personal history of other (healed) physical injury and trauma 09/29/2022    History of head injury    Personal history of other diseases of the circulatory system     History of other diseases of the circulatory system, not elsewhere classified    Personal history of other diseases of the respiratory system     History of asthma    Personal history of other endocrine, nutritional and metabolic disease 01/10/2019    History of iron deficiency    Personal history of other specified conditions 09/29/2022    History of headache    Personal history of traumatic brain injury     History of concussion    Rash and other nonspecific skin eruption 12/06/2022    Rash    Strain of muscle, fascia and tendon at neck level, initial encounter 12/05/2018    Cervical strain, acute    Syncope and collapse 11/30/2022    Micturition syncope    Unspecified asthma, uncomplicated ([facility]) 04/25/2018    Childhood asthma       Past Surgical History:   Procedure Laterality Date    OTHER SURGICAL HISTORY  11/19/2018    No history of surgery        reports that she has never smoked. She has never used smokeless tobacco. She reports that she does not currently use alcohol. She reports that she does not use drugs.    Review of Systems  Review of Systems   Constitutional:  Positive for fatigue. Negative for chills and fever.   HENT:  Positive for congestion, sinus pressure and sinus pain. Negative for ear  pain, hoarse voice, rhinorrhea, sneezing and sore throat.    Respiratory:  Negative for snoring, cough, shortness of breath and wheezing.    Cardiovascular:  Negative for chest pain.   Gastrointestinal:  Negative for constipation, nausea and vomiting.   Neurological:  Positive for light-headedness and headaches. Negative for dizziness and vertigo.   All other systems reviewed and are negative.      Objective    Vitals:    04/08/25 1901   BP: 105/68   Pulse: 89   Resp: 16   Temp: 36.6 °C (97.8 °F)   SpO2: 95%   Weight: 60.3 kg (133 lb)     No LMP recorded.    Physical Exam  Vitals and nursing note reviewed.   Constitutional:       General: She is not in acute distress.     Appearance: Normal appearance. She is normal weight. She is not ill-appearing or toxic-appearing.   HENT:      Head: Normocephalic.      Right Ear: Tympanic membrane, ear canal and external ear normal.      Left Ear: Tympanic membrane, ear canal and external ear normal.      Nose: Congestion present. No rhinorrhea.      Right Turbinates: Swollen.      Left Turbinates: Swollen.      Right Sinus: Frontal sinus tenderness present. No maxillary sinus tenderness.      Left Sinus: Frontal sinus tenderness present. No maxillary sinus tenderness.      Mouth/Throat:      Mouth: Mucous membranes are moist.      Pharynx: Oropharynx is clear. Postnasal drip present. No posterior oropharyngeal erythema.      Tonsils: No tonsillar exudate or tonsillar abscesses.   Eyes:      Pupils: Pupils are equal, round, and reactive to light.   Cardiovascular:      Rate and Rhythm: Normal rate and regular rhythm.      Pulses: Normal pulses.      Heart sounds: Normal heart sounds.   Pulmonary:      Effort: Pulmonary effort is normal. No respiratory distress.      Breath sounds: Normal breath sounds. No wheezing or rhonchi.   Musculoskeletal:         General: Normal range of motion.      Cervical back: Normal range of motion and neck supple. No rigidity.   Lymphadenopathy:       Cervical: Cervical adenopathy present.   Skin:     General: Skin is warm.   Neurological:      Mental Status: She is alert and oriented to person, place, and time.   Psychiatric:         Mood and Affect: Mood normal.         Behavior: Behavior normal.         Procedures    Point of Care Test & Imaging Results from this visit  Results for orders placed or performed in visit on 04/08/25   POCT SPOTFIRE R/ST Panel Mini w/COVID ([facility]) manually resulted    Specimen: Swab   Result Value Ref Range    POC Sars-Cov-2 PCR Negative Negative    POC Respiratory Syncytial Virus PCR Negative Negative    POC Influenza A Virus PCR Negative Negative    POC Influenza B Virus PCR Negative Negative    POC Human Rhinovirus PCR Negative Negative      Imaging  No results found.    Cardiology, Vascular, and Other Imaging  No other imaging results found for the past 2 days      Diagnostic study results (if any) were reviewed by LOIDA Bingham.    Assessment/Plan   Allergies, medications, history, and pertinent labs/EKGs/Imaging reviewed by LOIDA Bingham.     Medical Decision Making  Based on history of persistent sinus symptoms, likely bacterial sinusitis. Will start on oral antibiotics today. Encouraged patient to continue symptomatic and supportive care measures. Advised to follow-up with primary care provider if symptoms fail to improve or return to clinic with any new or worsening symptoms. Patient verbalized understanding and agreeable with plan.      Orders and Diagnoses  Diagnoses and all orders for this visit:  Sinus problem  -     POCT SPOTFIRE R/ST Panel Mini w/COVID ([facility]) manually resulted      Medical Admin Record      Patient disposition: Home    Electronically signed by LOIDA Bingham  7:39 PM

## 2025-04-14 ENCOUNTER — OFFICE VISIT (OUTPATIENT)
Dept: PRIMARY CARE | Facility: CLINIC | Age: 30
End: 2025-04-14
Payer: COMMERCIAL

## 2025-04-14 ENCOUNTER — TELEPHONE (OUTPATIENT)
Dept: PRIMARY CARE | Facility: CLINIC | Age: 30
End: 2025-04-14

## 2025-04-14 VITALS
WEIGHT: 135.5 LBS | TEMPERATURE: 97.3 F | DIASTOLIC BLOOD PRESSURE: 66 MMHG | OXYGEN SATURATION: 99 % | HEIGHT: 70 IN | BODY MASS INDEX: 19.4 KG/M2 | SYSTOLIC BLOOD PRESSURE: 103 MMHG | HEART RATE: 95 BPM

## 2025-04-14 DIAGNOSIS — J02.9 SORE THROAT: Primary | ICD-10-CM

## 2025-04-14 DIAGNOSIS — J02.9 PHARYNGITIS, UNSPECIFIED ETIOLOGY: ICD-10-CM

## 2025-04-14 LAB — POC RAPID STREP: NEGATIVE

## 2025-04-14 PROCEDURE — 3008F BODY MASS INDEX DOCD: CPT | Performed by: INTERNAL MEDICINE

## 2025-04-14 PROCEDURE — 99213 OFFICE O/P EST LOW 20 MIN: CPT | Performed by: INTERNAL MEDICINE

## 2025-04-14 PROCEDURE — 87880 STREP A ASSAY W/OPTIC: CPT | Performed by: INTERNAL MEDICINE

## 2025-04-14 PROCEDURE — 1036F TOBACCO NON-USER: CPT | Performed by: INTERNAL MEDICINE

## 2025-04-14 ASSESSMENT — ENCOUNTER SYMPTOMS
TROUBLE SWALLOWING: 0
CHILLS: 1
HOARSE VOICE: 1
SHORTNESS OF BREATH: 0
HEADACHES: 1
SORE THROAT: 1
SWOLLEN GLANDS: 1
FATIGUE: 1
DIARRHEA: 1
NECK PAIN: 1
COUGH: 0
ABDOMINAL PAIN: 1
STRIDOR: 0

## 2025-04-14 NOTE — TELEPHONE ENCOUNTER
Patient called asking to schedule an appointment for possible strep throat.  Patient was seen at an [facility] last Tuesday and diagnosed with sinusitis.  Patient started amoxicillin on Wednesday.  Patient thinks she has strep throat.  Patient states on Saturday it hurt to swallow.  Feels different than a sore throat.  Patient has dots on her uvula, tonsils and tongue.   Patient [phone].

## 2025-04-14 NOTE — PROGRESS NOTES
CHIEF COMPLAINT  Sore Throat    HISTORY OF PRESENT ILLNESS  Nadia Souza is a 29 y.o. female presents today for follow up of Sore Throat    Sore Throat   This is a new problem. The current episode started in the past 7 days. The problem has been gradually worsening. Neither side of throat is experiencing more pain than the other. There has been no fever. The pain is at a severity of 8/10. The pain is severe. Associated symptoms include abdominal pain, diarrhea, ear discharge, headaches, a hoarse voice, a plugged ear sensation, neck pain and swollen glands. Pertinent negatives include no congestion, coughing, drooling, ear pain, shortness of breath, stridor or trouble swallowing.       Started feeling sick March 20 - felt like a cold.  Tried to rest.  During the 2nd week, she felt well enough to work.  Last week, was very fatigued, states felt like sinus infection - pressure in face.  Not a lot of mucus production.    Went to urgent care 4/8, prescribed amoxicillin for sinusitis, which she started 4/9.  Tested negative for COVID, RSV, Flu A&B, and rhinovirus.    Sore throat started 4/12.    No documented fever, but feels hot and has chills.  No cough or runny nose.  Had pain in sinuses across cheeks, post-nasal drip.  No significant nasal congestion.   No nose blowing, no dental pain.  Tender swollen lymph nodes, getting worse.  Throat is painful, hurts to swallow.  No rash.  + diarrhea since starting amoxicillin.    Sister diagnosed with bronchitis last week.   No recent travel.  Taking ibuprofen.      REVIEW OF SYSTEMS  Review of Systems   Constitutional:  Positive for chills and fatigue.   HENT:  Positive for ear discharge, hoarse voice and sore throat. Negative for congestion, drooling, ear pain and trouble swallowing.    Respiratory:  Negative for cough, shortness of breath and stridor.    Gastrointestinal:  Positive for abdominal pain and diarrhea.   Musculoskeletal:  Positive for neck pain.   Neurological:   "Positive for headaches.       ALLERGIES  Pseudoephedrine and Sudafed om    MEDICATIONS  Current Outpatient Medications   Medication Instructions    amoxicillin-pot clavulanate (Augmentin) 875-125 mg tablet 875 mg, oral, 2 times daily    ciclopirox (Penlac) 8 % solution Topical, Nightly, Remove once a week with nail polish remover    fluticasone (Flonase) 50 mcg/actuation nasal spray 1 spray, Each Nostril, Daily, Shake gently. Before first use, prime pump. After use, clean tip and replace cap.       TOBACCO USE  Social History     Tobacco Use   Smoking Status Never   Smokeless Tobacco Never       DEPRESSION SCREEN  Over the past 2 weeks, how often have you been bothered by any of the following problems?  Little interest or pleasure in doing things: Not at all  Feeling down, depressed, or hopeless: Not at all    SURGICAL HISTORY  Past Surgical History:  11/19/2018: OTHER SURGICAL HISTORY      Comment:  No history of surgery       OBJECTIVE    /66   Pulse 95   Temp 36.3 °C (97.3 °F)   Ht 1.778 m (5' 10\")   Wt 61.5 kg (135 lb 8 oz)   SpO2 99%   BMI 19.44 kg/m²    BMI: Estimated body mass index is 19.44 kg/m² as calculated from the following:    Height as of this encounter: 1.778 m (5' 10\").    Weight as of this encounter: 61.5 kg (135 lb 8 oz).    BP Readings from Last 3 Encounters:   04/14/25 103/66   04/08/25 105/68   12/09/24 105/63      Wt Readings from Last 3 Encounters:   04/14/25 61.5 kg (135 lb 8 oz)   04/08/25 60.3 kg (133 lb)   02/04/25 59.9 kg (132 lb)        PHYSICAL EXAM  Physical Exam  Constitutional:       Appearance: Normal appearance.   HENT:      Head: Normocephalic and atraumatic.      Right Ear: Tympanic membrane normal.      Left Ear: Tympanic membrane and ear canal normal.      Mouth/Throat:      Pharynx: Oropharyngeal exudate (tonsils) present.   Cardiovascular:      Rate and Rhythm: Normal rate and regular rhythm.      Heart sounds: No murmur heard.  Pulmonary:      Effort: Pulmonary " effort is normal. No respiratory distress.      Breath sounds: Normal breath sounds. No wheezing.   Abdominal:      General: There is no distension.      Palpations: Abdomen is soft.      Tenderness: There is no abdominal tenderness.   Lymphadenopathy:      Cervical: Cervical adenopathy present.   Skin:     Findings: No rash.   Neurological:      General: No focal deficit present.      Mental Status: She is alert and oriented to person, place, and time.   Psychiatric:         Mood and Affect: Mood normal.          ASSESSMENT AND PLAN  Assessment/Plan   Problem List Items Addressed This Visit    None  Visit Diagnoses       Sore throat    -  Primary    Relevant Orders    POCT Rapid Strep A manually resulted (Completed)    Pharyngitis, unspecified etiology        Relevant Orders    CBC and Auto Differential    Comprehensive Metabolic Panel    Alaina-Nunez Virus Antibody Panel (VCA IgG/IgM, EA IgG, NA IgG)    CMV IgG, IgM          Pharyngitis - symptoms started 3 weeks ago, initially with sinus pressure.  - tested negative for COVID, Flu A&B, RSV, rhinovirus  - start amoxicillin  - symptoms worsened, with sore throat & exudates  - Rapid strep in office today negative.  - no rash since starting amoxicillin, however she is having diarrhea  - Likely viral syndrome: check CBC, CMP, EBV panel, CMV IgM & IgG  - continue supportive care with rest, fluids.  Can use Tylenol for sore throat, also Chloraseptic or Sucrets  - OK to stop the amoxicillin, as it has not helped, symptoms are actually worsening (with sore throat) and she has developed loose stool  - follow-up pending results.

## 2025-04-15 ENCOUNTER — TELEPHONE (OUTPATIENT)
Dept: PRIMARY CARE | Facility: CLINIC | Age: 30
End: 2025-04-15
Payer: COMMERCIAL

## 2025-04-15 NOTE — TELEPHONE ENCOUNTER
Rash on face arms legs, started couple hours ago. Has been in bed most of the day and did have blood work today at [facility]

## 2025-04-16 DIAGNOSIS — B27.99 MONONUCLEOSIS, INFECTIOUS, WITH HEPATITIS: Primary | ICD-10-CM

## 2025-04-16 DIAGNOSIS — B17.8 MONONUCLEOSIS, INFECTIOUS, WITH HEPATITIS: Primary | ICD-10-CM

## 2025-04-16 LAB
ALBUMIN SERPL-MCNC: 3.9 G/DL (ref 3.6–5.1)
ALP SERPL-CCNC: 253 U/L (ref 31–125)
ALT SERPL-CCNC: 446 U/L (ref 6–29)
ANION GAP SERPL CALCULATED.4IONS-SCNC: 8 MMOL/L (CALC) (ref 7–17)
AST SERPL-CCNC: 369 U/L (ref 10–30)
BASOPHILS # BLD AUTO: 100 CELLS/UL (ref 0–200)
BASOPHILS NFR BLD AUTO: 1.3 %
BILIRUB SERPL-MCNC: 0.6 MG/DL (ref 0.2–1.2)
BUN SERPL-MCNC: 10 MG/DL (ref 7–25)
CALCIUM SERPL-MCNC: 8.6 MG/DL (ref 8.6–10.2)
CHLORIDE SERPL-SCNC: 105 MMOL/L (ref 98–110)
CO2 SERPL-SCNC: 27 MMOL/L (ref 20–32)
CREAT SERPL-MCNC: 0.63 MG/DL (ref 0.5–0.96)
EBV NA IGG SER IA-ACNC: <18 U/ML
EBV VCA IGG SER IA-ACNC: 34.2 U/ML
EBV VCA IGM SER IA-ACNC: >160 U/ML
EGFRCR SERPLBLD CKD-EPI 2021: 125 ML/MIN/1.73M2
EOSINOPHIL # BLD AUTO: 146 CELLS/UL (ref 15–500)
EOSINOPHIL NFR BLD AUTO: 1.9 %
ERYTHROCYTE [DISTWIDTH] IN BLOOD BY AUTOMATED COUNT: 13 % (ref 11–15)
GLUCOSE SERPL-MCNC: 99 MG/DL (ref 65–99)
HCT VFR BLD AUTO: 40.5 % (ref 35–45)
HGB BLD-MCNC: 13.2 G/DL (ref 11.7–15.5)
LYMPHOCYTES # BLD AUTO: 4882 CELLS/UL (ref 850–3900)
LYMPHOCYTES NFR BLD AUTO: 63.4 %
MCH RBC QN AUTO: 30.7 PG (ref 27–33)
MCHC RBC AUTO-ENTMCNC: 32.6 G/DL (ref 32–36)
MCV RBC AUTO: 94.2 FL (ref 80–100)
MONOCYTES # BLD AUTO: 416 CELLS/UL (ref 200–950)
MONOCYTES NFR BLD AUTO: 5.4 %
NEUTROPHILS # BLD AUTO: 2156 CELLS/UL (ref 1500–7800)
NEUTROPHILS NFR BLD AUTO: 28 %
PLATELET # BLD AUTO: 156 THOUSAND/UL (ref 140–400)
PMV BLD REES-ECKER: 12.9 FL (ref 7.5–12.5)
POTASSIUM SERPL-SCNC: 4.1 MMOL/L (ref 3.5–5.3)
PROT SERPL-MCNC: 6.5 G/DL (ref 6.1–8.1)
QUEST EBV PANEL INTERPRETATION:: ABNORMAL
RBC # BLD AUTO: 4.3 MILLION/UL (ref 3.8–5.1)
SERVICE CMNT-IMP: ABNORMAL
SODIUM SERPL-SCNC: 140 MMOL/L (ref 135–146)
WBC # BLD AUTO: 7.7 THOUSAND/UL (ref 3.8–10.8)

## 2025-04-21 ENCOUNTER — APPOINTMENT (OUTPATIENT)
Dept: PRIMARY CARE | Facility: CLINIC | Age: 30
End: 2025-04-21
Payer: COMMERCIAL

## 2025-04-21 VITALS
SYSTOLIC BLOOD PRESSURE: 95 MMHG | WEIGHT: 136.3 LBS | DIASTOLIC BLOOD PRESSURE: 61 MMHG | BODY MASS INDEX: 19.51 KG/M2 | HEART RATE: 80 BPM | OXYGEN SATURATION: 100 % | TEMPERATURE: 97.4 F | HEIGHT: 70 IN

## 2025-04-21 DIAGNOSIS — Z88.0 ALLERGY TO AMOXICILLIN: ICD-10-CM

## 2025-04-21 DIAGNOSIS — H57.13 EYE DISCOMFORT, BILATERAL: ICD-10-CM

## 2025-04-21 DIAGNOSIS — H53.8 BLURRY VISION, BILATERAL: Primary | ICD-10-CM

## 2025-04-21 DIAGNOSIS — B27.00 INFECTIOUS MONONUCLEOSIS DUE TO EPSTEIN-BARR VIRUS (EBV): ICD-10-CM

## 2025-04-21 PROBLEM — B36.0 PITYRIASIS VERSICOLOR: Status: RESOLVED | Noted: 2022-12-05 | Resolved: 2025-04-21

## 2025-04-21 PROCEDURE — 3008F BODY MASS INDEX DOCD: CPT | Performed by: INTERNAL MEDICINE

## 2025-04-21 PROCEDURE — 99213 OFFICE O/P EST LOW 20 MIN: CPT | Performed by: INTERNAL MEDICINE

## 2025-04-21 PROCEDURE — 1036F TOBACCO NON-USER: CPT | Performed by: INTERNAL MEDICINE

## 2025-04-21 RX ORDER — FAMOTIDINE 20 MG/1
20 TABLET, FILM COATED ORAL
COMMUNITY
Start: 2025-04-16 | End: 2025-04-26

## 2025-04-21 RX ORDER — METHYLPREDNISOLONE 4 MG/1
TABLET ORAL
COMMUNITY
Start: 2025-04-16

## 2025-04-21 RX ORDER — CETIRIZINE HCL 1 MG/ML
10 SOLUTION, ORAL ORAL
COMMUNITY
Start: 2025-04-16 | End: 2025-04-26

## 2025-04-21 ASSESSMENT — ENCOUNTER SYMPTOMS
CONSTIPATION: 0
SHORTNESS OF BREATH: 1
FATIGUE: 1
CHILLS: 0
FEVER: 0
PALPITATIONS: 0
COUGH: 0
VOMITING: 0
NAUSEA: 0
DIARRHEA: 0

## 2025-04-21 ASSESSMENT — PATIENT HEALTH QUESTIONNAIRE - PHQ9
2. FEELING DOWN, DEPRESSED OR HOPELESS: NOT AT ALL
SUM OF ALL RESPONSES TO PHQ9 QUESTIONS 1 AND 2: 0
1. LITTLE INTEREST OR PLEASURE IN DOING THINGS: NOT AT ALL

## 2025-04-21 NOTE — PROGRESS NOTES
CHIEF COMPLAINT  Allergic Reaction    HISTORY OF PRESENT ILLNESS  Nadia Souza is a 29 y.o. female presents today for follow up of Allergic Reaction    HPI    Recently diagnosed with mononucleosis.  Prior to diagnosis, had been seen at an urgent care and prescribed amoxicillin.  Developed diffuse rash on amoxicillin - states she was having trouble breathing.  Went to [facility] ER 4/16/25 - prescribed Medrol dosepak, Pepcid, Zyrtec.  Blurry vision started Saturday - it's worsen in AM.  Comes and goes.  States eyes feel uncomfortable., like bad eye strain.  Sore throat is better today.    REVIEW OF SYSTEMS  Review of Systems   Constitutional:  Positive for fatigue. Negative for chills and fever.   Eyes:  Positive for visual disturbance.   Respiratory:  Positive for shortness of breath. Negative for cough.    Cardiovascular:  Negative for chest pain, palpitations and leg swelling.   Gastrointestinal:  Negative for constipation, diarrhea, nausea and vomiting.   Skin:  Positive for rash.       ALLERGIES  Pseudoephedrine and Sudafed om    MEDICATIONS  Current Outpatient Medications   Medication Instructions    amoxicillin-pot clavulanate (Augmentin) 875-125 mg tablet 875 mg, oral, 2 times daily    ciclopirox (Penlac) 8 % solution Topical, Nightly, Remove once a week with nail polish remover    fluticasone (Flonase) 50 mcg/actuation nasal spray 1 spray, Each Nostril, Daily, Shake gently. Before first use, prime pump. After use, clean tip and replace cap.    methylPREDNISolone (Medrol Dospak) 4 mg tablets TAKE 6 TABLETS ON DAY 1 AS DIRECTED ON PACKAGE AND DECREASE BY 1 TAB EACH DAY FOR A TOTAL OF 6 DAYS    Pepcid 20 mg    ZyrTEC 10 mg       TOBACCO USE  Tobacco Use History[1]    DEPRESSION SCREEN  Over the past 2 weeks, how often have you been bothered by any of the following problems?  Little interest or pleasure in doing things: Not at all  Feeling down, depressed, or hopeless: Not at all    SURGICAL HISTORY  Past  "Surgical History:  11/19/2018: OTHER SURGICAL HISTORY      Comment:  No history of surgery       OBJECTIVE    BP 95/61   Pulse 80   Temp 36.3 °C (97.4 °F)   Ht 1.778 m (5' 10\")   Wt 61.8 kg (136 lb 4.8 oz)   SpO2 100%   BMI 19.56 kg/m²    BMI: Estimated body mass index is 19.56 kg/m² as calculated from the following:    Height as of this encounter: 1.778 m (5' 10\").    Weight as of this encounter: 61.8 kg (136 lb 4.8 oz).    BP Readings from Last 3 Encounters:   04/21/25 95/61   04/14/25 103/66   04/08/25 105/68      Wt Readings from Last 3 Encounters:   04/21/25 61.8 kg (136 lb 4.8 oz)   04/14/25 61.5 kg (135 lb 8 oz)   04/08/25 60.3 kg (133 lb)        PHYSICAL EXAM  Physical Exam  Constitutional:       Appearance: Normal appearance.   HENT:      Head: Normocephalic and atraumatic.   Eyes:      Extraocular Movements: Extraocular movements intact.      Conjunctiva/sclera: Conjunctivae normal.      Pupils: Pupils are equal, round, and reactive to light.   Cardiovascular:      Rate and Rhythm: Normal rate and regular rhythm.      Heart sounds: No murmur heard.  Pulmonary:      Effort: Pulmonary effort is normal. No respiratory distress.      Breath sounds: Normal breath sounds. No wheezing.   Abdominal:      General: There is no distension.      Palpations: Abdomen is soft.      Tenderness: There is no abdominal tenderness.   Musculoskeletal:      Right lower leg: No edema.      Left lower leg: No edema.   Skin:     Findings: Rash (diffuse fine erythematous rash) present.   Neurological:      Mental Status: She is alert.   Psychiatric:         Mood and Affect: Mood normal.         Behavior: Behavior normal.          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  - repeat CBC and liver enzymes  - work excuse given    Diffuse rash - likely due to mono plus amoxicillin, however will refer to allergist to [facility] for true amoxicillin allergy.    Blurry vision with eye discomfort - started when she was placed on Medrol and antihistamines.  May be due to steroid.  Referral to ophthalmologist.    Follow-up pending results.          [1]   Social History  Tobacco Use   Smoking Status Never   Smokeless Tobacco Never

## 2025-04-21 NOTE — LETTER
April 21, 2025     Patient: Nadia Souza   YOB: 1995   Date of Visit: 4/21/2025       To Whom It May Concern:    Nadia Souza was seen in my clinic on 4/21/2025 at 3:00 pm. Please excuse Nadia for her absence from work 4/14/25 - 4/27/25.  OK to return to work 4/28/25.     If you have any questions or concerns, please don't hesitate to call.         Sincerely,         Rosalinda Casarez MD        CC: No Recipients

## 2025-04-22 LAB
ALBUMIN SERPL-MCNC: 4 G/DL (ref 3.6–5.1)
ALBUMIN/GLOB SERPL: 1.5 (CALC) (ref 1–2.5)
ALP SERPL-CCNC: 158 U/L (ref 31–125)
ALT SERPL-CCNC: 189 U/L (ref 6–29)
AST SERPL-CCNC: 42 U/L (ref 10–30)
BASOPHILS # BLD AUTO: 94 CELLS/UL (ref 0–200)
BASOPHILS NFR BLD AUTO: 1.2 %
BILIRUB DIRECT SERPL-MCNC: 0.1 MG/DL
BILIRUB INDIRECT SERPL-MCNC: 0.4 MG/DL (CALC) (ref 0.2–1.2)
BILIRUB SERPL-MCNC: 0.5 MG/DL (ref 0.2–1.2)
EOSINOPHIL # BLD AUTO: 257 CELLS/UL (ref 15–500)
EOSINOPHIL NFR BLD AUTO: 3.3 %
ERYTHROCYTE [DISTWIDTH] IN BLOOD BY AUTOMATED COUNT: 13 % (ref 11–15)
GLOBULIN SER CALC-MCNC: 2.6 G/DL (CALC) (ref 1.9–3.7)
HCT VFR BLD AUTO: 38.9 % (ref 35–45)
HGB BLD-MCNC: 13.1 G/DL (ref 11.7–15.5)
LYMPHOCYTES # BLD AUTO: 2504 CELLS/UL (ref 850–3900)
LYMPHOCYTES NFR BLD AUTO: 32.1 %
MCH RBC QN AUTO: 31.5 PG (ref 27–33)
MCHC RBC AUTO-ENTMCNC: 33.7 G/DL (ref 32–36)
MCV RBC AUTO: 93.5 FL (ref 80–100)
MONOCYTES # BLD AUTO: 577 CELLS/UL (ref 200–950)
MONOCYTES NFR BLD AUTO: 7.4 %
NEUTROPHILS # BLD AUTO: 4368 CELLS/UL (ref 1500–7800)
NEUTROPHILS NFR BLD AUTO: 56 %
PLATELET # BLD AUTO: 253 THOUSAND/UL (ref 140–400)
PMV BLD REES-ECKER: 12.3 FL (ref 7.5–12.5)
PROT SERPL-MCNC: 6.6 G/DL (ref 6.1–8.1)
RBC # BLD AUTO: 4.16 MILLION/UL (ref 3.8–5.1)
WBC # BLD AUTO: 7.8 THOUSAND/UL (ref 3.8–10.8)

## 2025-12-10 ENCOUNTER — APPOINTMENT (OUTPATIENT)
Dept: PRIMARY CARE | Facility: CLINIC | Age: 30
End: 2025-12-10
Payer: COMMERCIAL